# Patient Record
Sex: MALE | Race: WHITE
[De-identification: names, ages, dates, MRNs, and addresses within clinical notes are randomized per-mention and may not be internally consistent; named-entity substitution may affect disease eponyms.]

---

## 2020-05-28 ENCOUNTER — RESULT REVIEW (OUTPATIENT)
Age: 80
End: 2020-05-28

## 2020-05-28 ENCOUNTER — OUTPATIENT (OUTPATIENT)
Dept: OUTPATIENT SERVICES | Facility: HOSPITAL | Age: 80
LOS: 1 days | End: 2020-05-28
Payer: MEDICARE

## 2020-05-28 DIAGNOSIS — C50.521: ICD-10-CM

## 2020-05-28 DIAGNOSIS — Z17.0 ESTROGEN RECEPTOR POSITIVE STATUS [ER+]: ICD-10-CM

## 2020-05-28 PROCEDURE — 88321 CONSLTJ&REPRT SLD PREP ELSWR: CPT

## 2020-05-29 ENCOUNTER — OUTPATIENT (OUTPATIENT)
Dept: OUTPATIENT SERVICES | Facility: HOSPITAL | Age: 80
LOS: 1 days | End: 2020-05-29
Payer: MEDICARE

## 2020-05-29 DIAGNOSIS — Z98.890 OTHER SPECIFIED POSTPROCEDURAL STATES: Chronic | ICD-10-CM

## 2020-05-29 DIAGNOSIS — C50.521: ICD-10-CM

## 2020-05-29 DIAGNOSIS — Z17.0 ESTROGEN RECEPTOR POSITIVE STATUS [ER+]: ICD-10-CM

## 2020-05-29 DIAGNOSIS — Z01.818 ENCOUNTER FOR OTHER PREPROCEDURAL EXAMINATION: ICD-10-CM

## 2020-05-29 LAB
A1C WITH ESTIMATED AVERAGE GLUCOSE RESULT: 7.9 % — HIGH (ref 4–5.6)
ANION GAP SERPL CALC-SCNC: 6 MMOL/L — SIGNIFICANT CHANGE UP (ref 5–17)
APPEARANCE UR: CLEAR — SIGNIFICANT CHANGE UP
BASOPHILS # BLD AUTO: 0.03 K/UL — SIGNIFICANT CHANGE UP (ref 0–0.2)
BASOPHILS NFR BLD AUTO: 0.6 % — SIGNIFICANT CHANGE UP (ref 0–2)
BILIRUB UR-MCNC: NEGATIVE — SIGNIFICANT CHANGE UP
BUN SERPL-MCNC: 31 MG/DL — HIGH (ref 7–23)
CALCIUM SERPL-MCNC: 9.8 MG/DL — SIGNIFICANT CHANGE UP (ref 8.5–10.1)
CHLORIDE SERPL-SCNC: 105 MMOL/L — SIGNIFICANT CHANGE UP (ref 96–108)
CO2 SERPL-SCNC: 26 MMOL/L — SIGNIFICANT CHANGE UP (ref 22–31)
COLOR SPEC: YELLOW — SIGNIFICANT CHANGE UP
CREAT SERPL-MCNC: 1.47 MG/DL — HIGH (ref 0.5–1.3)
DIFF PNL FLD: NEGATIVE — SIGNIFICANT CHANGE UP
EOSINOPHIL # BLD AUTO: 0.15 K/UL — SIGNIFICANT CHANGE UP (ref 0–0.5)
EOSINOPHIL NFR BLD AUTO: 2.9 % — SIGNIFICANT CHANGE UP (ref 0–6)
ESTIMATED AVERAGE GLUCOSE: 180 MG/DL — HIGH (ref 68–114)
GLUCOSE SERPL-MCNC: 196 MG/DL — HIGH (ref 70–99)
GLUCOSE UR QL: 1000 MG/DL
HCT VFR BLD CALC: 39.5 % — SIGNIFICANT CHANGE UP (ref 39–50)
HGB BLD-MCNC: 13.2 G/DL — SIGNIFICANT CHANGE UP (ref 13–17)
IMM GRANULOCYTES NFR BLD AUTO: 0.2 % — SIGNIFICANT CHANGE UP (ref 0–1.5)
KETONES UR-MCNC: NEGATIVE — SIGNIFICANT CHANGE UP
LEUKOCYTE ESTERASE UR-ACNC: NEGATIVE — SIGNIFICANT CHANGE UP
LYMPHOCYTES # BLD AUTO: 1.7 K/UL — SIGNIFICANT CHANGE UP (ref 1–3.3)
LYMPHOCYTES # BLD AUTO: 32.4 % — SIGNIFICANT CHANGE UP (ref 13–44)
MCHC RBC-ENTMCNC: 28.6 PG — SIGNIFICANT CHANGE UP (ref 27–34)
MCHC RBC-ENTMCNC: 33.4 GM/DL — SIGNIFICANT CHANGE UP (ref 32–36)
MCV RBC AUTO: 85.5 FL — SIGNIFICANT CHANGE UP (ref 80–100)
MONOCYTES # BLD AUTO: 0.39 K/UL — SIGNIFICANT CHANGE UP (ref 0–0.9)
MONOCYTES NFR BLD AUTO: 7.4 % — SIGNIFICANT CHANGE UP (ref 2–14)
NEUTROPHILS # BLD AUTO: 2.96 K/UL — SIGNIFICANT CHANGE UP (ref 1.8–7.4)
NEUTROPHILS NFR BLD AUTO: 56.5 % — SIGNIFICANT CHANGE UP (ref 43–77)
NITRITE UR-MCNC: NEGATIVE — SIGNIFICANT CHANGE UP
PH UR: 6 — SIGNIFICANT CHANGE UP (ref 5–8)
PLATELET # BLD AUTO: 170 K/UL — SIGNIFICANT CHANGE UP (ref 150–400)
POTASSIUM SERPL-MCNC: 3.9 MMOL/L — SIGNIFICANT CHANGE UP (ref 3.5–5.3)
POTASSIUM SERPL-SCNC: 3.9 MMOL/L — SIGNIFICANT CHANGE UP (ref 3.5–5.3)
PROT UR-MCNC: NEGATIVE MG/DL — SIGNIFICANT CHANGE UP
RBC # BLD: 4.62 M/UL — SIGNIFICANT CHANGE UP (ref 4.2–5.8)
RBC # FLD: 14 % — SIGNIFICANT CHANGE UP (ref 10.3–14.5)
SODIUM SERPL-SCNC: 137 MMOL/L — SIGNIFICANT CHANGE UP (ref 135–145)
SP GR SPEC: 1.01 — SIGNIFICANT CHANGE UP (ref 1.01–1.02)
UROBILINOGEN FLD QL: NEGATIVE MG/DL — SIGNIFICANT CHANGE UP
WBC # BLD: 5.24 K/UL — SIGNIFICANT CHANGE UP (ref 3.8–10.5)
WBC # FLD AUTO: 5.24 K/UL — SIGNIFICANT CHANGE UP (ref 3.8–10.5)

## 2020-05-29 PROCEDURE — 83036 HEMOGLOBIN GLYCOSYLATED A1C: CPT

## 2020-05-29 PROCEDURE — 86850 RBC ANTIBODY SCREEN: CPT

## 2020-05-29 PROCEDURE — 80048 BASIC METABOLIC PNL TOTAL CA: CPT

## 2020-05-29 PROCEDURE — 85025 COMPLETE CBC W/AUTO DIFF WBC: CPT

## 2020-05-29 PROCEDURE — 36415 COLL VENOUS BLD VENIPUNCTURE: CPT

## 2020-05-29 PROCEDURE — 93010 ELECTROCARDIOGRAM REPORT: CPT

## 2020-05-29 PROCEDURE — 86901 BLOOD TYPING SEROLOGIC RH(D): CPT

## 2020-05-29 PROCEDURE — 86900 BLOOD TYPING SEROLOGIC ABO: CPT

## 2020-05-29 PROCEDURE — 93005 ELECTROCARDIOGRAM TRACING: CPT

## 2020-05-29 PROCEDURE — 81003 URINALYSIS AUTO W/O SCOPE: CPT

## 2020-05-29 NOTE — ASU PATIENT PROFILE, ADULT - IS PATIENT PREGNANT?
Called in promethazine syrup to  [facility] pt complained of the taste of pill. Promethazine 6.25/5ml 10-20ml Q6 PRN #240ml   
not applicable (Male)

## 2020-05-29 NOTE — ASU PATIENT PROFILE, ADULT - PMH
Atrial fibrillation  1 episode in 2014  Breast cancer in male  Right breast (dx 2020)  Diabetes    Gout    HLD (hyperlipidemia)

## 2020-05-30 DIAGNOSIS — Z01.818 ENCOUNTER FOR OTHER PREPROCEDURAL EXAMINATION: ICD-10-CM

## 2020-06-02 ENCOUNTER — OUTPATIENT (OUTPATIENT)
Dept: OUTPATIENT SERVICES | Facility: HOSPITAL | Age: 80
LOS: 1 days | End: 2020-06-02
Payer: MEDICARE

## 2020-06-02 DIAGNOSIS — Z98.890 OTHER SPECIFIED POSTPROCEDURAL STATES: Chronic | ICD-10-CM

## 2020-06-02 DIAGNOSIS — Z11.59 ENCOUNTER FOR SCREENING FOR OTHER VIRAL DISEASES: ICD-10-CM

## 2020-06-02 PROCEDURE — U0003: CPT

## 2020-06-03 DIAGNOSIS — Z11.59 ENCOUNTER FOR SCREENING FOR OTHER VIRAL DISEASES: ICD-10-CM

## 2020-06-03 LAB — SARS-COV-2 RNA SPEC QL NAA+PROBE: SIGNIFICANT CHANGE UP

## 2020-06-03 RX ORDER — HYDROMORPHONE HYDROCHLORIDE 2 MG/ML
0.5 INJECTION INTRAMUSCULAR; INTRAVENOUS; SUBCUTANEOUS
Refills: 0 | Status: DISCONTINUED | OUTPATIENT
Start: 2020-06-04 | End: 2020-06-04

## 2020-06-03 RX ORDER — SODIUM CHLORIDE 9 MG/ML
1000 INJECTION, SOLUTION INTRAVENOUS
Refills: 0 | Status: DISCONTINUED | OUTPATIENT
Start: 2020-06-04 | End: 2020-06-04

## 2020-06-03 RX ORDER — MEPERIDINE HYDROCHLORIDE 50 MG/ML
12.5 INJECTION INTRAMUSCULAR; INTRAVENOUS; SUBCUTANEOUS
Refills: 0 | Status: DISCONTINUED | OUTPATIENT
Start: 2020-06-04 | End: 2020-06-04

## 2020-06-03 RX ORDER — OXYCODONE HYDROCHLORIDE 5 MG/1
5 TABLET ORAL ONCE
Refills: 0 | Status: DISCONTINUED | OUTPATIENT
Start: 2020-06-04 | End: 2020-06-04

## 2020-06-03 RX ORDER — ONDANSETRON 8 MG/1
4 TABLET, FILM COATED ORAL ONCE
Refills: 0 | Status: DISCONTINUED | OUTPATIENT
Start: 2020-06-04 | End: 2020-06-04

## 2020-06-03 RX ORDER — FENTANYL CITRATE 50 UG/ML
50 INJECTION INTRAVENOUS
Refills: 0 | Status: DISCONTINUED | OUTPATIENT
Start: 2020-06-04 | End: 2020-06-04

## 2020-06-04 ENCOUNTER — RESULT REVIEW (OUTPATIENT)
Age: 80
End: 2020-06-04

## 2020-06-04 ENCOUNTER — OUTPATIENT (OUTPATIENT)
Dept: INPATIENT UNIT | Facility: HOSPITAL | Age: 80
LOS: 1 days | Discharge: HOME | End: 2020-06-04
Payer: MEDICARE

## 2020-06-04 VITALS
OXYGEN SATURATION: 99 % | RESPIRATION RATE: 14 BRPM | SYSTOLIC BLOOD PRESSURE: 131 MMHG | TEMPERATURE: 97 F | DIASTOLIC BLOOD PRESSURE: 76 MMHG | HEART RATE: 63 BPM

## 2020-06-04 VITALS
OXYGEN SATURATION: 100 % | DIASTOLIC BLOOD PRESSURE: 75 MMHG | SYSTOLIC BLOOD PRESSURE: 135 MMHG | RESPIRATION RATE: 16 BRPM | WEIGHT: 195.11 LBS | HEART RATE: 49 BPM | TEMPERATURE: 98 F | HEIGHT: 73 IN

## 2020-06-04 DIAGNOSIS — Z17.0 ESTROGEN RECEPTOR POSITIVE STATUS [ER+]: ICD-10-CM

## 2020-06-04 DIAGNOSIS — C50.521: ICD-10-CM

## 2020-06-04 DIAGNOSIS — Z98.890 OTHER SPECIFIED POSTPROCEDURAL STATES: Chronic | ICD-10-CM

## 2020-06-04 PROCEDURE — 88307 TISSUE EXAM BY PATHOLOGIST: CPT

## 2020-06-04 PROCEDURE — 78195 LYMPH SYSTEM IMAGING: CPT | Mod: 26

## 2020-06-04 PROCEDURE — A9520: CPT

## 2020-06-04 PROCEDURE — 19307 MAST MOD RAD: CPT | Mod: AS,59,RT

## 2020-06-04 PROCEDURE — 88307 TISSUE EXAM BY PATHOLOGIST: CPT | Mod: 26

## 2020-06-04 PROCEDURE — 19303 MAST SIMPLE COMPLETE: CPT | Mod: AS,59,LT

## 2020-06-04 PROCEDURE — 19366: CPT | Mod: AS,RT

## 2020-06-04 PROCEDURE — 78195 LYMPH SYSTEM IMAGING: CPT

## 2020-06-04 RX ORDER — ALLOPURINOL 300 MG
1 TABLET ORAL
Qty: 0 | Refills: 0 | DISCHARGE

## 2020-06-04 RX ORDER — ATORVASTATIN CALCIUM 80 MG/1
1 TABLET, FILM COATED ORAL
Qty: 0 | Refills: 0 | DISCHARGE

## 2020-06-04 RX ORDER — ACETAMINOPHEN 500 MG
975 TABLET ORAL ONCE
Refills: 0 | Status: COMPLETED | OUTPATIENT
Start: 2020-06-04 | End: 2020-06-04

## 2020-06-04 RX ORDER — FAMOTIDINE 10 MG/ML
20 INJECTION INTRAVENOUS ONCE
Refills: 0 | Status: COMPLETED | OUTPATIENT
Start: 2020-06-04 | End: 2020-06-04

## 2020-06-04 RX ORDER — CHOLECALCIFEROL (VITAMIN D3) 125 MCG
0 CAPSULE ORAL
Qty: 0 | Refills: 0 | DISCHARGE

## 2020-06-04 RX ADMIN — FAMOTIDINE 20 MILLIGRAM(S): 10 INJECTION INTRAVENOUS at 07:27

## 2020-06-04 RX ADMIN — Medication 975 MILLIGRAM(S): at 07:27

## 2020-06-04 NOTE — ASU DISCHARGE PLAN (ADULT/PEDIATRIC) - CALL YOUR DOCTOR IF YOU HAVE ANY OF THE FOLLOWING:
Swelling that gets worse/Excessive diarrhea/Nausea and vomiting that does not stop/Unable to urinate/Increased irritability or sluggishness/Pain not relieved by Medications/Inability to tolerate liquids or foods/Fever greater than (need to indicate Fahrenheit or Celsius)/Numbness, tingling, color or temperature change to extremity/Bleeding that does not stop

## 2020-06-04 NOTE — ASU DISCHARGE PLAN (ADULT/PEDIATRIC) - CARE PROVIDER_API CALL
Bernard Bush  SURGERY  [address] [place] [address]  Phone: [phone]  Fax: [phone]  Follow Up Time:

## 2020-06-11 DIAGNOSIS — I48.91 UNSPECIFIED ATRIAL FIBRILLATION: ICD-10-CM

## 2020-06-11 DIAGNOSIS — C50.921 MALIGNANT NEOPLASM OF UNSPECIFIED SITE OF RIGHT MALE BREAST: ICD-10-CM

## 2020-06-11 DIAGNOSIS — E78.5 HYPERLIPIDEMIA, UNSPECIFIED: ICD-10-CM

## 2020-06-11 DIAGNOSIS — Z79.82 LONG TERM (CURRENT) USE OF ASPIRIN: ICD-10-CM

## 2020-06-11 DIAGNOSIS — E11.9 TYPE 2 DIABETES MELLITUS WITHOUT COMPLICATIONS: ICD-10-CM

## 2020-06-11 DIAGNOSIS — M10.9 GOUT, UNSPECIFIED: ICD-10-CM

## 2020-06-11 DIAGNOSIS — Z15.01 GENETIC SUSCEPTIBILITY TO MALIGNANT NEOPLASM OF BREAST: ICD-10-CM

## 2020-06-12 DIAGNOSIS — C50.521: ICD-10-CM

## 2020-06-12 DIAGNOSIS — Z17.0 ESTROGEN RECEPTOR POSITIVE STATUS [ER+]: ICD-10-CM

## 2020-06-17 DIAGNOSIS — C50.521: ICD-10-CM

## 2020-06-17 DIAGNOSIS — Z17.0 ESTROGEN RECEPTOR POSITIVE STATUS [ER+]: ICD-10-CM

## 2022-01-01 ENCOUNTER — INPATIENT (INPATIENT)
Facility: HOSPITAL | Age: 82
LOS: 1 days | DRG: 208 | End: 2022-12-28
Attending: SURGERY | Admitting: SURGERY
Payer: MEDICARE

## 2022-01-01 ENCOUNTER — EMERGENCY (EMERGENCY)
Facility: HOSPITAL | Age: 82
LOS: 0 days | Discharge: HOME | End: 2022-03-12
Attending: EMERGENCY MEDICINE
Payer: MEDICARE

## 2022-01-01 VITALS
OXYGEN SATURATION: 98 % | TEMPERATURE: 98 F | SYSTOLIC BLOOD PRESSURE: 120 MMHG | DIASTOLIC BLOOD PRESSURE: 66 MMHG | HEART RATE: 66 BPM | RESPIRATION RATE: 20 BRPM

## 2022-01-01 VITALS — HEART RATE: 80 BPM | TEMPERATURE: 98 F | DIASTOLIC BLOOD PRESSURE: 79 MMHG | SYSTOLIC BLOOD PRESSURE: 91 MMHG

## 2022-01-01 VITALS — WEIGHT: 175.05 LBS | HEIGHT: 73 IN

## 2022-01-01 DIAGNOSIS — Z98.890 OTHER SPECIFIED POSTPROCEDURAL STATES: Chronic | ICD-10-CM

## 2022-01-01 DIAGNOSIS — E78.5 HYPERLIPIDEMIA, UNSPECIFIED: ICD-10-CM

## 2022-01-01 DIAGNOSIS — N18.9 CHRONIC KIDNEY DISEASE, UNSPECIFIED: ICD-10-CM

## 2022-01-01 DIAGNOSIS — R57.8 OTHER SHOCK: ICD-10-CM

## 2022-01-01 DIAGNOSIS — N17.9 ACUTE KIDNEY FAILURE, UNSPECIFIED: ICD-10-CM

## 2022-01-01 DIAGNOSIS — Z79.82 LONG TERM (CURRENT) USE OF ASPIRIN: ICD-10-CM

## 2022-01-01 DIAGNOSIS — I82.401 ACUTE EMBOLISM AND THROMBOSIS OF UNSPECIFIED DEEP VEINS OF RIGHT LOWER EXTREMITY: ICD-10-CM

## 2022-01-01 DIAGNOSIS — R07.89 OTHER CHEST PAIN: ICD-10-CM

## 2022-01-01 DIAGNOSIS — M10.9 GOUT, UNSPECIFIED: ICD-10-CM

## 2022-01-01 DIAGNOSIS — R10.9 UNSPECIFIED ABDOMINAL PAIN: ICD-10-CM

## 2022-01-01 DIAGNOSIS — Z95.1 PRESENCE OF AORTOCORONARY BYPASS GRAFT: ICD-10-CM

## 2022-01-01 DIAGNOSIS — J96.01 ACUTE RESPIRATORY FAILURE WITH HYPOXIA: ICD-10-CM

## 2022-01-01 DIAGNOSIS — E87.20 ACIDOSIS, UNSPECIFIED: ICD-10-CM

## 2022-01-01 DIAGNOSIS — I26.09 OTHER PULMONARY EMBOLISM WITH ACUTE COR PULMONALE: ICD-10-CM

## 2022-01-01 DIAGNOSIS — Z90.13 ACQUIRED ABSENCE OF BILATERAL BREASTS AND NIPPLES: ICD-10-CM

## 2022-01-01 DIAGNOSIS — I45.10 UNSPECIFIED RIGHT BUNDLE-BRANCH BLOCK: ICD-10-CM

## 2022-01-01 DIAGNOSIS — I31.39 OTHER PERICARDIAL EFFUSION (NONINFLAMMATORY): ICD-10-CM

## 2022-01-01 DIAGNOSIS — K72.00 ACUTE AND SUBACUTE HEPATIC FAILURE WITHOUT COMA: ICD-10-CM

## 2022-01-01 DIAGNOSIS — G93.1 ANOXIC BRAIN DAMAGE, NOT ELSEWHERE CLASSIFIED: ICD-10-CM

## 2022-01-01 DIAGNOSIS — I48.91 UNSPECIFIED ATRIAL FIBRILLATION: ICD-10-CM

## 2022-01-01 DIAGNOSIS — Z85.3 PERSONAL HISTORY OF MALIGNANT NEOPLASM OF BREAST: ICD-10-CM

## 2022-01-01 DIAGNOSIS — I25.10 ATHEROSCLEROTIC HEART DISEASE OF NATIVE CORONARY ARTERY WITHOUT ANGINA PECTORIS: ICD-10-CM

## 2022-01-01 DIAGNOSIS — Z66 DO NOT RESUSCITATE: ICD-10-CM

## 2022-01-01 DIAGNOSIS — Z79.899 OTHER LONG TERM (CURRENT) DRUG THERAPY: ICD-10-CM

## 2022-01-01 DIAGNOSIS — E43 UNSPECIFIED SEVERE PROTEIN-CALORIE MALNUTRITION: ICD-10-CM

## 2022-01-01 DIAGNOSIS — Z95.1 PRESENCE OF AORTOCORONARY BYPASS GRAFT: Chronic | ICD-10-CM

## 2022-01-01 DIAGNOSIS — Z79.84 LONG TERM (CURRENT) USE OF ORAL HYPOGLYCEMIC DRUGS: ICD-10-CM

## 2022-01-01 DIAGNOSIS — I46.9 CARDIAC ARREST, CAUSE UNSPECIFIED: ICD-10-CM

## 2022-01-01 DIAGNOSIS — Z86.74 PERSONAL HISTORY OF SUDDEN CARDIAC ARREST: ICD-10-CM

## 2022-01-01 DIAGNOSIS — Z20.822 CONTACT WITH AND (SUSPECTED) EXPOSURE TO COVID-19: ICD-10-CM

## 2022-01-01 DIAGNOSIS — Z90.13 ACQUIRED ABSENCE OF BILATERAL BREASTS AND NIPPLES: Chronic | ICD-10-CM

## 2022-01-01 DIAGNOSIS — G93.6 CEREBRAL EDEMA: ICD-10-CM

## 2022-01-01 DIAGNOSIS — E11.9 TYPE 2 DIABETES MELLITUS WITHOUT COMPLICATIONS: ICD-10-CM

## 2022-01-01 DIAGNOSIS — E11.22 TYPE 2 DIABETES MELLITUS WITH DIABETIC CHRONIC KIDNEY DISEASE: ICD-10-CM

## 2022-01-01 DIAGNOSIS — Z51.5 ENCOUNTER FOR PALLIATIVE CARE: ICD-10-CM

## 2022-01-01 LAB
A1C WITH ESTIMATED AVERAGE GLUCOSE RESULT: 7.7 % — HIGH (ref 4–5.6)
ALBUMIN SERPL ELPH-MCNC: 1.7 G/DL — LOW (ref 3.3–5)
ALBUMIN SERPL ELPH-MCNC: 1.7 G/DL — LOW (ref 3.3–5)
ALBUMIN SERPL ELPH-MCNC: 1.8 G/DL — LOW (ref 3.3–5)
ALBUMIN SERPL ELPH-MCNC: 2.2 G/DL — LOW (ref 3.3–5)
ALBUMIN SERPL ELPH-MCNC: 3.4 G/DL — SIGNIFICANT CHANGE UP (ref 3.3–5)
ALP SERPL-CCNC: 111 U/L — SIGNIFICANT CHANGE UP (ref 40–120)
ALP SERPL-CCNC: 152 U/L — HIGH (ref 40–120)
ALP SERPL-CCNC: 46 U/L — SIGNIFICANT CHANGE UP (ref 40–120)
ALP SERPL-CCNC: 69 U/L — SIGNIFICANT CHANGE UP (ref 40–120)
ALP SERPL-CCNC: 96 U/L — SIGNIFICANT CHANGE UP (ref 40–120)
ALT FLD-CCNC: 112 U/L — HIGH (ref 12–78)
ALT FLD-CCNC: 31 U/L — SIGNIFICANT CHANGE UP (ref 12–78)
ALT FLD-CCNC: 424 U/L — HIGH (ref 12–78)
ALT FLD-CCNC: 428 U/L — HIGH (ref 12–78)
ALT FLD-CCNC: 770 U/L — HIGH (ref 12–78)
ANION GAP SERPL CALC-SCNC: 10 MMOL/L — SIGNIFICANT CHANGE UP (ref 5–17)
ANION GAP SERPL CALC-SCNC: 11 MMOL/L — SIGNIFICANT CHANGE UP (ref 5–17)
ANION GAP SERPL CALC-SCNC: 16 MMOL/L — SIGNIFICANT CHANGE UP (ref 5–17)
ANION GAP SERPL CALC-SCNC: 16 MMOL/L — SIGNIFICANT CHANGE UP (ref 5–17)
ANION GAP SERPL CALC-SCNC: 23 MMOL/L — HIGH (ref 5–17)
ANION GAP SERPL CALC-SCNC: 7 MMOL/L — SIGNIFICANT CHANGE UP (ref 5–17)
APPEARANCE UR: ABNORMAL
APTT BLD: 119.5 SEC — HIGH (ref 27.5–35.5)
APTT BLD: 136.7 SEC — CRITICAL HIGH (ref 27.5–35.5)
APTT BLD: 153.7 SEC — CRITICAL HIGH (ref 27.5–35.5)
APTT BLD: 26 SEC — LOW (ref 27.5–35.5)
APTT BLD: 75.5 SEC — HIGH (ref 27.5–35.5)
APTT BLD: 84.2 SEC — HIGH (ref 27.5–35.5)
APTT BLD: 90.5 SEC — HIGH (ref 27.5–35.5)
AST SERPL-CCNC: 129 U/L — HIGH (ref 15–37)
AST SERPL-CCNC: 22 U/L — SIGNIFICANT CHANGE UP (ref 15–37)
AST SERPL-CCNC: 605 U/L — HIGH (ref 15–37)
AST SERPL-CCNC: 615 U/L — HIGH (ref 15–37)
AST SERPL-CCNC: 658 U/L — HIGH (ref 15–37)
BACTERIA # UR AUTO: ABNORMAL
BASE EXCESS BLDA CALC-SCNC: -14.5 MMOL/L — LOW (ref -2–3)
BASE EXCESS BLDA CALC-SCNC: -15.6 MMOL/L — LOW (ref -2–3)
BASE EXCESS BLDA CALC-SCNC: -7.9 MMOL/L — LOW (ref -2–3)
BASOPHILS # BLD AUTO: 0 K/UL — SIGNIFICANT CHANGE UP (ref 0–0.2)
BASOPHILS # BLD AUTO: 0 K/UL — SIGNIFICANT CHANGE UP (ref 0–0.2)
BASOPHILS # BLD AUTO: 0.03 K/UL — SIGNIFICANT CHANGE UP (ref 0–0.2)
BASOPHILS NFR BLD AUTO: 0 % — SIGNIFICANT CHANGE UP (ref 0–2)
BASOPHILS NFR BLD AUTO: 0 % — SIGNIFICANT CHANGE UP (ref 0–2)
BASOPHILS NFR BLD AUTO: 0.6 % — SIGNIFICANT CHANGE UP (ref 0–2)
BILIRUB DIRECT SERPL-MCNC: 0.1 MG/DL — SIGNIFICANT CHANGE UP (ref 0–0.3)
BILIRUB INDIRECT FLD-MCNC: 0.3 MG/DL — SIGNIFICANT CHANGE UP (ref 0.2–1)
BILIRUB SERPL-MCNC: 0.2 MG/DL — SIGNIFICANT CHANGE UP (ref 0.2–1.2)
BILIRUB SERPL-MCNC: 0.2 MG/DL — SIGNIFICANT CHANGE UP (ref 0.2–1.2)
BILIRUB SERPL-MCNC: 0.4 MG/DL — SIGNIFICANT CHANGE UP (ref 0.2–1.2)
BILIRUB SERPL-MCNC: 0.4 MG/DL — SIGNIFICANT CHANGE UP (ref 0.2–1.2)
BILIRUB SERPL-MCNC: 0.9 MG/DL — SIGNIFICANT CHANGE UP (ref 0.2–1.2)
BILIRUB UR-MCNC: NEGATIVE — SIGNIFICANT CHANGE UP
BIZARRE PLATELETS BLD QL SMEAR: PRESENT — SIGNIFICANT CHANGE UP
BLOOD GAS COMMENTS ARTERIAL: SIGNIFICANT CHANGE UP
BUN SERPL-MCNC: 22 MG/DL — SIGNIFICANT CHANGE UP (ref 7–23)
BUN SERPL-MCNC: 27 MG/DL — HIGH (ref 7–23)
BUN SERPL-MCNC: 27 MG/DL — HIGH (ref 7–23)
BUN SERPL-MCNC: 38 MG/DL — HIGH (ref 7–23)
BUN SERPL-MCNC: 45 MG/DL — HIGH (ref 7–23)
BUN SERPL-MCNC: 46 MG/DL — HIGH (ref 7–23)
BURR CELLS BLD QL SMEAR: PRESENT — SIGNIFICANT CHANGE UP
CALCIUM SERPL-MCNC: 7 MG/DL — LOW (ref 8.5–10.1)
CALCIUM SERPL-MCNC: 7 MG/DL — LOW (ref 8.5–10.1)
CALCIUM SERPL-MCNC: 7.3 MG/DL — LOW (ref 8.5–10.1)
CALCIUM SERPL-MCNC: 7.5 MG/DL — LOW (ref 8.5–10.1)
CALCIUM SERPL-MCNC: 8.8 MG/DL — SIGNIFICANT CHANGE UP (ref 8.5–10.1)
CALCIUM SERPL-MCNC: 9.3 MG/DL — SIGNIFICANT CHANGE UP (ref 8.5–10.1)
CHLORIDE SERPL-SCNC: 103 MMOL/L — SIGNIFICANT CHANGE UP (ref 96–108)
CHLORIDE SERPL-SCNC: 103 MMOL/L — SIGNIFICANT CHANGE UP (ref 96–108)
CHLORIDE SERPL-SCNC: 106 MMOL/L — SIGNIFICANT CHANGE UP (ref 96–108)
CHLORIDE SERPL-SCNC: 106 MMOL/L — SIGNIFICANT CHANGE UP (ref 96–108)
CHLORIDE SERPL-SCNC: 107 MMOL/L — SIGNIFICANT CHANGE UP (ref 96–108)
CHLORIDE SERPL-SCNC: 113 MMOL/L — HIGH (ref 96–108)
CHOLEST SERPL-MCNC: 95 MG/DL — SIGNIFICANT CHANGE UP
CO2 BLDA-SCNC: 14 MMOL/L — LOW (ref 19–24)
CO2 BLDA-SCNC: 15 MMOL/L — LOW (ref 19–24)
CO2 BLDA-SCNC: 19 MMOL/L — SIGNIFICANT CHANGE UP (ref 19–24)
CO2 SERPL-SCNC: 12 MMOL/L — LOW (ref 22–31)
CO2 SERPL-SCNC: 16 MMOL/L — LOW (ref 22–31)
CO2 SERPL-SCNC: 19 MMOL/L — LOW (ref 22–31)
CO2 SERPL-SCNC: 27 MMOL/L — SIGNIFICANT CHANGE UP (ref 22–31)
CO2 SERPL-SCNC: 28 MMOL/L — SIGNIFICANT CHANGE UP (ref 22–31)
CO2 SERPL-SCNC: 28 MMOL/L — SIGNIFICANT CHANGE UP (ref 22–31)
COLOR SPEC: YELLOW — SIGNIFICANT CHANGE UP
CREAT SERPL-MCNC: 1.37 MG/DL — HIGH (ref 0.5–1.3)
CREAT SERPL-MCNC: 1.64 MG/DL — HIGH (ref 0.5–1.3)
CREAT SERPL-MCNC: 1.85 MG/DL — HIGH (ref 0.5–1.3)
CREAT SERPL-MCNC: 2.32 MG/DL — HIGH (ref 0.5–1.3)
CREAT SERPL-MCNC: 2.9 MG/DL — HIGH (ref 0.5–1.3)
CREAT SERPL-MCNC: 3.23 MG/DL — HIGH (ref 0.5–1.3)
DIFF PNL FLD: ABNORMAL
EGFR: 19 ML/MIN/1.73M2 — LOW
EGFR: 21 ML/MIN/1.73M2 — LOW
EGFR: 28 ML/MIN/1.73M2 — LOW
EGFR: 36 ML/MIN/1.73M2 — LOW
EGFR: 42 ML/MIN/1.73M2 — LOW
EGFR: 52 ML/MIN/1.73M2 — LOW
EOSINOPHIL # BLD AUTO: 0 K/UL — SIGNIFICANT CHANGE UP (ref 0–0.5)
EOSINOPHIL # BLD AUTO: 0.11 K/UL — SIGNIFICANT CHANGE UP (ref 0–0.5)
EOSINOPHIL # BLD AUTO: 0.11 K/UL — SIGNIFICANT CHANGE UP (ref 0–0.5)
EOSINOPHIL NFR BLD AUTO: 0 % — SIGNIFICANT CHANGE UP (ref 0–6)
EOSINOPHIL NFR BLD AUTO: 1 % — SIGNIFICANT CHANGE UP (ref 0–6)
EOSINOPHIL NFR BLD AUTO: 2.2 % — SIGNIFICANT CHANGE UP (ref 0–6)
EPI CELLS # UR: SIGNIFICANT CHANGE UP
ESTIMATED AVERAGE GLUCOSE: 174 MG/DL — HIGH (ref 68–114)
GAS PNL BLDA: SIGNIFICANT CHANGE UP
GLUCOSE BLDC GLUCOMTR-MCNC: 308 MG/DL — HIGH (ref 70–99)
GLUCOSE BLDC GLUCOMTR-MCNC: 318 MG/DL — HIGH (ref 70–99)
GLUCOSE BLDC GLUCOMTR-MCNC: 341 MG/DL — HIGH (ref 70–99)
GLUCOSE BLDC GLUCOMTR-MCNC: 364 MG/DL — HIGH (ref 70–99)
GLUCOSE BLDC GLUCOMTR-MCNC: 385 MG/DL — HIGH (ref 70–99)
GLUCOSE BLDC GLUCOMTR-MCNC: 419 MG/DL — HIGH (ref 70–99)
GLUCOSE SERPL-MCNC: 197 MG/DL — HIGH (ref 70–99)
GLUCOSE SERPL-MCNC: 380 MG/DL — HIGH (ref 70–99)
GLUCOSE SERPL-MCNC: 418 MG/DL — HIGH (ref 70–99)
GLUCOSE SERPL-MCNC: 426 MG/DL — HIGH (ref 70–99)
GLUCOSE SERPL-MCNC: 448 MG/DL — HIGH (ref 70–99)
GLUCOSE SERPL-MCNC: 454 MG/DL — CRITICAL HIGH (ref 70–99)
GLUCOSE UR QL: 1000 MG/DL
HCO3 BLDA-SCNC: 13 MMOL/L — LOW (ref 21–28)
HCO3 BLDA-SCNC: 14 MMOL/L — LOW (ref 21–28)
HCO3 BLDA-SCNC: 18 MMOL/L — LOW (ref 21–28)
HCT VFR BLD CALC: 24 % — LOW (ref 39–50)
HCT VFR BLD CALC: 25.5 % — LOW (ref 39–50)
HCT VFR BLD CALC: 30.7 % — LOW (ref 39–50)
HCT VFR BLD CALC: 31 % — LOW (ref 39–50)
HCT VFR BLD CALC: 32.7 % — LOW (ref 39–50)
HCT VFR BLD CALC: 38 % — LOW (ref 39–50)
HCT VFR BLD CALC: 40 % — SIGNIFICANT CHANGE UP (ref 39–50)
HDLC SERPL-MCNC: 33 MG/DL — LOW
HGB BLD-MCNC: 10.3 G/DL — LOW (ref 13–17)
HGB BLD-MCNC: 11.3 G/DL — LOW (ref 13–17)
HGB BLD-MCNC: 12.5 G/DL — LOW (ref 13–17)
HGB BLD-MCNC: 7.7 G/DL — LOW (ref 13–17)
HGB BLD-MCNC: 8.4 G/DL — LOW (ref 13–17)
HGB BLD-MCNC: 9.3 G/DL — LOW (ref 13–17)
HGB BLD-MCNC: 9.8 G/DL — LOW (ref 13–17)
IMM GRANULOCYTES NFR BLD AUTO: 0.2 % — SIGNIFICANT CHANGE UP (ref 0–1.5)
INR BLD: 1.09 RATIO — SIGNIFICANT CHANGE UP (ref 0.88–1.16)
INR BLD: 1.73 RATIO — HIGH (ref 0.88–1.16)
KETONES UR-MCNC: NEGATIVE — SIGNIFICANT CHANGE UP
LACTATE SERPL-SCNC: 10.5 MMOL/L — CRITICAL HIGH (ref 0.7–2)
LACTATE SERPL-SCNC: 14.3 MMOL/L — CRITICAL HIGH (ref 0.7–2)
LACTATE SERPL-SCNC: 17.4 MMOL/L — SIGNIFICANT CHANGE UP (ref 0.7–2)
LACTATE SERPL-SCNC: 9.5 MMOL/L — CRITICAL HIGH (ref 0.7–2)
LEUKOCYTE ESTERASE UR-ACNC: NEGATIVE — SIGNIFICANT CHANGE UP
LIDOCAIN IGE QN: 134 U/L — SIGNIFICANT CHANGE UP (ref 73–393)
LIDOCAIN IGE QN: 79 U/L — SIGNIFICANT CHANGE UP (ref 73–393)
LIPID PNL WITH DIRECT LDL SERPL: 46 MG/DL — SIGNIFICANT CHANGE UP
LYMPHOCYTES # BLD AUTO: 0.68 K/UL — LOW (ref 1–3.3)
LYMPHOCYTES # BLD AUTO: 1.3 K/UL — SIGNIFICANT CHANGE UP (ref 1–3.3)
LYMPHOCYTES # BLD AUTO: 2 % — LOW (ref 13–44)
LYMPHOCYTES # BLD AUTO: 25.8 % — SIGNIFICANT CHANGE UP (ref 13–44)
LYMPHOCYTES # BLD AUTO: 28 % — SIGNIFICANT CHANGE UP (ref 13–44)
LYMPHOCYTES # BLD AUTO: 3.07 K/UL — SIGNIFICANT CHANGE UP (ref 1–3.3)
MAGNESIUM SERPL-MCNC: 1.7 MG/DL — SIGNIFICANT CHANGE UP (ref 1.6–2.6)
MAGNESIUM SERPL-MCNC: 1.8 MG/DL — SIGNIFICANT CHANGE UP (ref 1.6–2.6)
MAGNESIUM SERPL-MCNC: 2.4 MG/DL — SIGNIFICANT CHANGE UP (ref 1.6–2.6)
MANUAL SMEAR VERIFICATION: SIGNIFICANT CHANGE UP
MCHC RBC-ENTMCNC: 27 PG — SIGNIFICANT CHANGE UP (ref 27–34)
MCHC RBC-ENTMCNC: 27.4 PG — SIGNIFICANT CHANGE UP (ref 27–34)
MCHC RBC-ENTMCNC: 27.4 PG — SIGNIFICANT CHANGE UP (ref 27–34)
MCHC RBC-ENTMCNC: 27.8 PG — SIGNIFICANT CHANGE UP (ref 27–34)
MCHC RBC-ENTMCNC: 27.9 PG — SIGNIFICANT CHANGE UP (ref 27–34)
MCHC RBC-ENTMCNC: 27.9 PG — SIGNIFICANT CHANGE UP (ref 27–34)
MCHC RBC-ENTMCNC: 28.3 GM/DL — LOW (ref 32–36)
MCHC RBC-ENTMCNC: 28.7 PG — SIGNIFICANT CHANGE UP (ref 27–34)
MCHC RBC-ENTMCNC: 30.3 GM/DL — LOW (ref 32–36)
MCHC RBC-ENTMCNC: 31.5 GM/DL — LOW (ref 32–36)
MCHC RBC-ENTMCNC: 31.6 GM/DL — LOW (ref 32–36)
MCHC RBC-ENTMCNC: 32.1 GM/DL — SIGNIFICANT CHANGE UP (ref 32–36)
MCHC RBC-ENTMCNC: 32.9 GM/DL — SIGNIFICANT CHANGE UP (ref 32–36)
MCHC RBC-ENTMCNC: 32.9 GM/DL — SIGNIFICANT CHANGE UP (ref 32–36)
MCV RBC AUTO: 84.2 FL — SIGNIFICANT CHANGE UP (ref 80–100)
MCV RBC AUTO: 84.7 FL — SIGNIFICANT CHANGE UP (ref 80–100)
MCV RBC AUTO: 87.2 FL — SIGNIFICANT CHANGE UP (ref 80–100)
MCV RBC AUTO: 88.1 FL — SIGNIFICANT CHANGE UP (ref 80–100)
MCV RBC AUTO: 88.6 FL — SIGNIFICANT CHANGE UP (ref 80–100)
MCV RBC AUTO: 90.6 FL — SIGNIFICANT CHANGE UP (ref 80–100)
MCV RBC AUTO: 97.1 FL — SIGNIFICANT CHANGE UP (ref 80–100)
MONOCYTES # BLD AUTO: 0.22 K/UL — SIGNIFICANT CHANGE UP (ref 0–0.9)
MONOCYTES # BLD AUTO: 0.57 K/UL — SIGNIFICANT CHANGE UP (ref 0–0.9)
MONOCYTES # BLD AUTO: 0.68 K/UL — SIGNIFICANT CHANGE UP (ref 0–0.9)
MONOCYTES NFR BLD AUTO: 11.3 % — SIGNIFICANT CHANGE UP (ref 2–14)
MONOCYTES NFR BLD AUTO: 2 % — SIGNIFICANT CHANGE UP (ref 2–14)
MONOCYTES NFR BLD AUTO: 2 % — SIGNIFICANT CHANGE UP (ref 2–14)
NEUTROPHILS # BLD AUTO: 3.02 K/UL — SIGNIFICANT CHANGE UP (ref 1.8–7.4)
NEUTROPHILS # BLD AUTO: 32.52 K/UL — HIGH (ref 1.8–7.4)
NEUTROPHILS # BLD AUTO: 7.46 K/UL — HIGH (ref 1.8–7.4)
NEUTROPHILS NFR BLD AUTO: 59.9 % — SIGNIFICANT CHANGE UP (ref 43–77)
NEUTROPHILS NFR BLD AUTO: 66 % — SIGNIFICANT CHANGE UP (ref 43–77)
NEUTROPHILS NFR BLD AUTO: 90 % — HIGH (ref 43–77)
NEUTS BAND # BLD: 2 % — SIGNIFICANT CHANGE UP (ref 0–8)
NITRITE UR-MCNC: NEGATIVE — SIGNIFICANT CHANGE UP
NON HDL CHOLESTEROL: 62 MG/DL — SIGNIFICANT CHANGE UP
NRBC # BLD: 0 /100 — SIGNIFICANT CHANGE UP (ref 0–0)
NRBC # BLD: SIGNIFICANT CHANGE UP /100 WBCS (ref 0–0)
NRBC # BLD: SIGNIFICANT CHANGE UP /100 WBCS (ref 0–0)
NT-PROBNP SERPL-SCNC: 569 PG/ML — HIGH (ref 0–450)
NT-PROBNP SERPL-SCNC: 803 PG/ML — HIGH (ref 0–450)
PCO2 BLDA: 33 MMHG — LOW (ref 35–48)
PCO2 BLDA: 36 MMHG — SIGNIFICANT CHANGE UP (ref 35–48)
PCO2 BLDA: 44 MMHG — SIGNIFICANT CHANGE UP (ref 35–48)
PCO2 BLDV: 79 MMHG — HIGH (ref 42–55)
PH BLDA: 7.1 — CRITICAL LOW (ref 7.35–7.45)
PH BLDA: 7.19 — CRITICAL LOW (ref 7.35–7.45)
PH BLDA: 7.3 — LOW (ref 7.35–7.45)
PH BLDV: <6.8 — CRITICAL LOW (ref 7.32–7.43)
PH UR: 6.5 — SIGNIFICANT CHANGE UP (ref 5–8)
PHOSPHATE SERPL-MCNC: 5.4 MG/DL — HIGH (ref 2.5–4.5)
PHOSPHATE SERPL-MCNC: 5.9 MG/DL — HIGH (ref 2.5–4.5)
PHOSPHATE SERPL-MCNC: 6.7 MG/DL — HIGH (ref 2.5–4.5)
PLAT MORPH BLD: NORMAL — SIGNIFICANT CHANGE UP
PLATELET # BLD AUTO: 138 K/UL — LOW (ref 150–400)
PLATELET # BLD AUTO: 143 K/UL — LOW (ref 150–400)
PLATELET # BLD AUTO: 145 K/UL — LOW (ref 150–400)
PLATELET # BLD AUTO: 153 K/UL — SIGNIFICANT CHANGE UP (ref 150–400)
PLATELET # BLD AUTO: 202 K/UL — SIGNIFICANT CHANGE UP (ref 150–400)
PLATELET # BLD AUTO: 213 K/UL — SIGNIFICANT CHANGE UP (ref 150–400)
PLATELET # BLD AUTO: 228 K/UL — SIGNIFICANT CHANGE UP (ref 150–400)
PO2 BLDA: 112 MMHG — HIGH (ref 83–108)
PO2 BLDA: 138 MMHG — HIGH (ref 83–108)
PO2 BLDA: 231 MMHG — HIGH (ref 83–108)
PO2 BLDV: 44 MMHG — SIGNIFICANT CHANGE UP (ref 25–45)
POLYCHROMASIA BLD QL SMEAR: SLIGHT — SIGNIFICANT CHANGE UP
POTASSIUM SERPL-MCNC: 3.6 MMOL/L — SIGNIFICANT CHANGE UP (ref 3.5–5.3)
POTASSIUM SERPL-MCNC: 3.9 MMOL/L — SIGNIFICANT CHANGE UP (ref 3.5–5.3)
POTASSIUM SERPL-MCNC: 3.9 MMOL/L — SIGNIFICANT CHANGE UP (ref 3.5–5.3)
POTASSIUM SERPL-MCNC: 4 MMOL/L — SIGNIFICANT CHANGE UP (ref 3.5–5.3)
POTASSIUM SERPL-MCNC: 4.3 MMOL/L — SIGNIFICANT CHANGE UP (ref 3.5–5.3)
POTASSIUM SERPL-MCNC: 4.4 MMOL/L — SIGNIFICANT CHANGE UP (ref 3.5–5.3)
POTASSIUM SERPL-SCNC: 3.6 MMOL/L — SIGNIFICANT CHANGE UP (ref 3.5–5.3)
POTASSIUM SERPL-SCNC: 3.9 MMOL/L — SIGNIFICANT CHANGE UP (ref 3.5–5.3)
POTASSIUM SERPL-SCNC: 3.9 MMOL/L — SIGNIFICANT CHANGE UP (ref 3.5–5.3)
POTASSIUM SERPL-SCNC: 4 MMOL/L — SIGNIFICANT CHANGE UP (ref 3.5–5.3)
POTASSIUM SERPL-SCNC: 4.3 MMOL/L — SIGNIFICANT CHANGE UP (ref 3.5–5.3)
POTASSIUM SERPL-SCNC: 4.4 MMOL/L — SIGNIFICANT CHANGE UP (ref 3.5–5.3)
PROT SERPL-MCNC: 4.5 GM/DL — LOW (ref 6–8.3)
PROT SERPL-MCNC: 4.6 GM/DL — LOW (ref 6–8.3)
PROT SERPL-MCNC: 4.6 GM/DL — LOW (ref 6–8.3)
PROT SERPL-MCNC: 5.9 GM/DL — LOW (ref 6–8.3)
PROT SERPL-MCNC: 6.7 GM/DL — SIGNIFICANT CHANGE UP (ref 6–8.3)
PROT UR-MCNC: 100
PROTHROM AB SERPL-ACNC: 12.7 SEC — SIGNIFICANT CHANGE UP (ref 10.5–13.4)
PROTHROM AB SERPL-ACNC: 20.2 SEC — HIGH (ref 10.5–13.4)
RAPID RVP RESULT: SIGNIFICANT CHANGE UP
RBC # BLD: 2.85 M/UL — LOW (ref 4.2–5.8)
RBC # BLD: 3.01 M/UL — LOW (ref 4.2–5.8)
RBC # BLD: 3.39 M/UL — LOW (ref 4.2–5.8)
RBC # BLD: 3.52 M/UL — LOW (ref 4.2–5.8)
RBC # BLD: 3.69 M/UL — LOW (ref 4.2–5.8)
RBC # BLD: 4.12 M/UL — LOW (ref 4.2–5.8)
RBC # BLD: 4.36 M/UL — SIGNIFICANT CHANGE UP (ref 4.2–5.8)
RBC # FLD: 13.6 % — SIGNIFICANT CHANGE UP (ref 10.3–14.5)
RBC # FLD: 13.8 % — SIGNIFICANT CHANGE UP (ref 10.3–14.5)
RBC # FLD: 13.9 % — SIGNIFICANT CHANGE UP (ref 10.3–14.5)
RBC # FLD: 14 % — SIGNIFICANT CHANGE UP (ref 10.3–14.5)
RBC BLD AUTO: SIGNIFICANT CHANGE UP
RBC CASTS # UR COMP ASSIST: ABNORMAL /HPF (ref 0–4)
SAO2 % BLDA: 100 % — SIGNIFICANT CHANGE UP
SAO2 % BLDA: 100 % — SIGNIFICANT CHANGE UP (ref 94–98)
SAO2 % BLDA: 99 % — SIGNIFICANT CHANGE UP
SAO2 % BLDV: 42 % — SIGNIFICANT CHANGE UP (ref 67–88)
SARS-COV-2 RNA SPEC QL NAA+PROBE: SIGNIFICANT CHANGE UP
SODIUM SERPL-SCNC: 141 MMOL/L — SIGNIFICANT CHANGE UP (ref 135–145)
SODIUM SERPL-SCNC: 142 MMOL/L — SIGNIFICANT CHANGE UP (ref 135–145)
SODIUM SERPL-SCNC: 145 MMOL/L — SIGNIFICANT CHANGE UP (ref 135–145)
SP GR SPEC: 1.01 — SIGNIFICANT CHANGE UP (ref 1.01–1.02)
TRIGL SERPL-MCNC: 81 MG/DL — SIGNIFICANT CHANGE UP
TROPONIN I, HIGH SENSITIVITY RESULT: 3335.62 NG/L — HIGH
TROPONIN I, HIGH SENSITIVITY RESULT: 3873.23 NG/L — HIGH
TROPONIN I, HIGH SENSITIVITY RESULT: 61.16 NG/L — SIGNIFICANT CHANGE UP
TROPONIN I, HIGH SENSITIVITY RESULT: 7.12 NG/L — SIGNIFICANT CHANGE UP
UROBILINOGEN FLD QL: NEGATIVE — SIGNIFICANT CHANGE UP
VARIANT LYMPHS # BLD: 1 % — SIGNIFICANT CHANGE UP (ref 0–6)
WBC # BLD: 10.97 K/UL — HIGH (ref 3.8–10.5)
WBC # BLD: 17.36 K/UL — HIGH (ref 3.8–10.5)
WBC # BLD: 18.74 K/UL — HIGH (ref 3.8–10.5)
WBC # BLD: 34.23 K/UL — HIGH (ref 3.8–10.5)
WBC # BLD: 34.3 K/UL — HIGH (ref 3.8–10.5)
WBC # BLD: 39.67 K/UL — HIGH (ref 3.8–10.5)
WBC # BLD: 5.04 K/UL — SIGNIFICANT CHANGE UP (ref 3.8–10.5)
WBC # FLD AUTO: 10.97 K/UL — HIGH (ref 3.8–10.5)
WBC # FLD AUTO: 17.36 K/UL — HIGH (ref 3.8–10.5)
WBC # FLD AUTO: 18.74 K/UL — HIGH (ref 3.8–10.5)
WBC # FLD AUTO: 34.23 K/UL — HIGH (ref 3.8–10.5)
WBC # FLD AUTO: 34.3 K/UL — HIGH (ref 3.8–10.5)
WBC # FLD AUTO: 39.67 K/UL — HIGH (ref 3.8–10.5)
WBC # FLD AUTO: 5.04 K/UL — SIGNIFICANT CHANGE UP (ref 3.8–10.5)
WBC UR QL: SIGNIFICANT CHANGE UP /HPF (ref 0–5)

## 2022-01-01 PROCEDURE — 36415 COLL VENOUS BLD VENIPUNCTURE: CPT

## 2022-01-01 PROCEDURE — 71045 X-RAY EXAM CHEST 1 VIEW: CPT | Mod: 26

## 2022-01-01 PROCEDURE — 85730 THROMBOPLASTIN TIME PARTIAL: CPT

## 2022-01-01 PROCEDURE — 99285 EMERGENCY DEPT VISIT HI MDM: CPT | Mod: 25

## 2022-01-01 PROCEDURE — 82803 BLOOD GASES ANY COMBINATION: CPT

## 2022-01-01 PROCEDURE — 74177 CT ABD & PELVIS W/CONTRAST: CPT | Mod: 26,MD

## 2022-01-01 PROCEDURE — 99291 CRITICAL CARE FIRST HOUR: CPT

## 2022-01-01 PROCEDURE — 99291 CRITICAL CARE FIRST HOUR: CPT | Mod: 25

## 2022-01-01 PROCEDURE — 86901 BLOOD TYPING SEROLOGIC RH(D): CPT

## 2022-01-01 PROCEDURE — 85025 COMPLETE CBC W/AUTO DIFF WBC: CPT

## 2022-01-01 PROCEDURE — 83880 ASSAY OF NATRIURETIC PEPTIDE: CPT

## 2022-01-01 PROCEDURE — 99497 ADVNCD CARE PLAN 30 MIN: CPT | Mod: 25

## 2022-01-01 PROCEDURE — 99497 ADVNCD CARE PLAN 30 MIN: CPT

## 2022-01-01 PROCEDURE — 71275 CT ANGIOGRAPHY CHEST: CPT | Mod: 26,ME

## 2022-01-01 PROCEDURE — G1004: CPT

## 2022-01-01 PROCEDURE — 85610 PROTHROMBIN TIME: CPT

## 2022-01-01 PROCEDURE — 71045 X-RAY EXAM CHEST 1 VIEW: CPT

## 2022-01-01 PROCEDURE — 36556 INSERT NON-TUNNEL CV CATH: CPT

## 2022-01-01 PROCEDURE — 99498 ADVNCD CARE PLAN ADDL 30 MIN: CPT

## 2022-01-01 PROCEDURE — 84484 ASSAY OF TROPONIN QUANT: CPT

## 2022-01-01 PROCEDURE — 83735 ASSAY OF MAGNESIUM: CPT

## 2022-01-01 PROCEDURE — 99284 EMERGENCY DEPT VISIT MOD MDM: CPT

## 2022-01-01 PROCEDURE — 83036 HEMOGLOBIN GLYCOSYLATED A1C: CPT

## 2022-01-01 PROCEDURE — 93306 TTE W/DOPPLER COMPLETE: CPT

## 2022-01-01 PROCEDURE — 80076 HEPATIC FUNCTION PANEL: CPT

## 2022-01-01 PROCEDURE — 93005 ELECTROCARDIOGRAM TRACING: CPT

## 2022-01-01 PROCEDURE — 80048 BASIC METABOLIC PNL TOTAL CA: CPT

## 2022-01-01 PROCEDURE — C9113: CPT

## 2022-01-01 PROCEDURE — 93010 ELECTROCARDIOGRAM REPORT: CPT

## 2022-01-01 PROCEDURE — 74177 CT ABD & PELVIS W/CONTRAST: CPT | Mod: MD

## 2022-01-01 PROCEDURE — 93306 TTE W/DOPPLER COMPLETE: CPT | Mod: 26

## 2022-01-01 PROCEDURE — 70450 CT HEAD/BRAIN W/O DYE: CPT | Mod: 26

## 2022-01-01 PROCEDURE — 83690 ASSAY OF LIPASE: CPT

## 2022-01-01 PROCEDURE — 93970 EXTREMITY STUDY: CPT | Mod: 26

## 2022-01-01 PROCEDURE — 99233 SBSQ HOSP IP/OBS HIGH 50: CPT

## 2022-01-01 PROCEDURE — 99292 CRITICAL CARE ADDL 30 MIN: CPT | Mod: 25

## 2022-01-01 PROCEDURE — 70450 CT HEAD/BRAIN W/O DYE: CPT | Mod: MG

## 2022-01-01 PROCEDURE — 94003 VENT MGMT INPAT SUBQ DAY: CPT

## 2022-01-01 PROCEDURE — 80053 COMPREHEN METABOLIC PANEL: CPT

## 2022-01-01 PROCEDURE — 93970 EXTREMITY STUDY: CPT

## 2022-01-01 PROCEDURE — 71275 CT ANGIOGRAPHY CHEST: CPT | Mod: ME

## 2022-01-01 PROCEDURE — 99223 1ST HOSP IP/OBS HIGH 75: CPT

## 2022-01-01 PROCEDURE — 36600 WITHDRAWAL OF ARTERIAL BLOOD: CPT

## 2022-01-01 PROCEDURE — 82962 GLUCOSE BLOOD TEST: CPT

## 2022-01-01 PROCEDURE — 71275 CT ANGIOGRAPHY CHEST: CPT | Mod: MA

## 2022-01-01 PROCEDURE — 71275 CT ANGIOGRAPHY CHEST: CPT | Mod: 26,MA

## 2022-01-01 PROCEDURE — 80061 LIPID PANEL: CPT

## 2022-01-01 PROCEDURE — 86900 BLOOD TYPING SEROLOGIC ABO: CPT

## 2022-01-01 PROCEDURE — 86850 RBC ANTIBODY SCREEN: CPT

## 2022-01-01 PROCEDURE — 85027 COMPLETE CBC AUTOMATED: CPT

## 2022-01-01 PROCEDURE — 83605 ASSAY OF LACTIC ACID: CPT

## 2022-01-01 PROCEDURE — 84100 ASSAY OF PHOSPHORUS: CPT

## 2022-01-01 RX ORDER — HYDROMORPHONE HYDROCHLORIDE 2 MG/ML
1 INJECTION INTRAMUSCULAR; INTRAVENOUS; SUBCUTANEOUS
Refills: 0 | Status: DISCONTINUED | OUTPATIENT
Start: 2022-01-01 | End: 2022-01-01

## 2022-01-01 RX ORDER — SODIUM BICARBONATE 1 MEQ/ML
50 SYRINGE (ML) INTRAVENOUS ONCE
Refills: 0 | Status: COMPLETED | OUTPATIENT
Start: 2022-01-01 | End: 2022-01-01

## 2022-01-01 RX ORDER — METOPROLOL TARTRATE 50 MG
1 TABLET ORAL
Qty: 0 | Refills: 0 | DISCHARGE

## 2022-01-01 RX ORDER — FOLIC ACID 0.8 MG
1 TABLET ORAL
Qty: 0 | Refills: 0 | DISCHARGE

## 2022-01-01 RX ORDER — ATORVASTATIN CALCIUM 80 MG/1
20 TABLET, FILM COATED ORAL AT BEDTIME
Refills: 0 | Status: DISCONTINUED | OUTPATIENT
Start: 2022-01-01 | End: 2022-01-01

## 2022-01-01 RX ORDER — NOREPINEPHRINE BITARTRATE/D5W 8 MG/250ML
0.05 PLASTIC BAG, INJECTION (ML) INTRAVENOUS
Qty: 8 | Refills: 0 | Status: DISCONTINUED | OUTPATIENT
Start: 2022-01-01 | End: 2022-01-01

## 2022-01-01 RX ORDER — SODIUM CHLORIDE 9 MG/ML
1000 INJECTION, SOLUTION INTRAVENOUS
Refills: 0 | Status: DISCONTINUED | OUTPATIENT
Start: 2022-01-01 | End: 2022-01-01

## 2022-01-01 RX ORDER — DEXTROSE 50 % IN WATER 50 %
25 SYRINGE (ML) INTRAVENOUS ONCE
Refills: 0 | Status: DISCONTINUED | OUTPATIENT
Start: 2022-01-01 | End: 2022-01-01

## 2022-01-01 RX ORDER — PANTOPRAZOLE SODIUM 20 MG/1
40 TABLET, DELAYED RELEASE ORAL DAILY
Refills: 0 | Status: DISCONTINUED | OUTPATIENT
Start: 2022-01-01 | End: 2022-01-01

## 2022-01-01 RX ORDER — EPINEPHRINE 0.3 MG/.3ML
0.8 INJECTION INTRAMUSCULAR; SUBCUTANEOUS
Qty: 4 | Refills: 0 | Status: DISCONTINUED | OUTPATIENT
Start: 2022-01-01 | End: 2022-01-01

## 2022-01-01 RX ORDER — HEPARIN SODIUM 5000 [USP'U]/ML
5000 INJECTION INTRAVENOUS; SUBCUTANEOUS EVERY 12 HOURS
Refills: 0 | Status: DISCONTINUED | OUTPATIENT
Start: 2022-01-01 | End: 2022-01-01

## 2022-01-01 RX ORDER — ASPIRIN/CALCIUM CARB/MAGNESIUM 324 MG
1 TABLET ORAL
Qty: 0 | Refills: 0 | DISCHARGE

## 2022-01-01 RX ORDER — ACETAMINOPHEN 500 MG
1000 TABLET ORAL ONCE
Refills: 0 | Status: COMPLETED | OUTPATIENT
Start: 2022-01-01 | End: 2022-01-01

## 2022-01-01 RX ORDER — DEXTROSE 50 % IN WATER 50 %
12.5 SYRINGE (ML) INTRAVENOUS ONCE
Refills: 0 | Status: DISCONTINUED | OUTPATIENT
Start: 2022-01-01 | End: 2022-01-01

## 2022-01-01 RX ORDER — DEXTROSE 50 % IN WATER 50 %
15 SYRINGE (ML) INTRAVENOUS ONCE
Refills: 0 | Status: DISCONTINUED | OUTPATIENT
Start: 2022-01-01 | End: 2022-01-01

## 2022-01-01 RX ORDER — SODIUM CHLORIDE 9 MG/ML
1000 INJECTION INTRAMUSCULAR; INTRAVENOUS; SUBCUTANEOUS
Refills: 0 | Status: DISCONTINUED | OUTPATIENT
Start: 2022-01-01 | End: 2022-01-01

## 2022-01-01 RX ORDER — CHLORHEXIDINE GLUCONATE 213 G/1000ML
15 SOLUTION TOPICAL EVERY 12 HOURS
Refills: 0 | Status: DISCONTINUED | OUTPATIENT
Start: 2022-01-01 | End: 2022-01-01

## 2022-01-01 RX ORDER — HEPARIN SODIUM 5000 [USP'U]/ML
6000 INJECTION INTRAVENOUS; SUBCUTANEOUS ONCE
Refills: 0 | Status: COMPLETED | OUTPATIENT
Start: 2022-01-01 | End: 2022-01-01

## 2022-01-01 RX ORDER — GLIMEPIRIDE 1 MG
1 TABLET ORAL
Qty: 0 | Refills: 0 | DISCHARGE

## 2022-01-01 RX ORDER — PIOGLITAZONE HYDROCHLORIDE 15 MG/1
1 TABLET ORAL
Qty: 0 | Refills: 0 | DISCHARGE

## 2022-01-01 RX ORDER — INSULIN LISPRO 100/ML
10 VIAL (ML) SUBCUTANEOUS ONCE
Refills: 0 | Status: COMPLETED | OUTPATIENT
Start: 2022-01-01 | End: 2022-01-01

## 2022-01-01 RX ORDER — PHENYLEPHRINE HYDROCHLORIDE 10 MG/ML
0.1 INJECTION INTRAVENOUS
Qty: 160 | Refills: 0 | Status: DISCONTINUED | OUTPATIENT
Start: 2022-01-01 | End: 2022-01-01

## 2022-01-01 RX ORDER — HEPARIN SODIUM 5000 [USP'U]/ML
3000 INJECTION INTRAVENOUS; SUBCUTANEOUS EVERY 6 HOURS
Refills: 0 | Status: DISCONTINUED | OUTPATIENT
Start: 2022-01-01 | End: 2022-01-01

## 2022-01-01 RX ORDER — INSULIN GLARGINE 100 [IU]/ML
15 INJECTION, SOLUTION SUBCUTANEOUS EVERY MORNING
Refills: 0 | Status: DISCONTINUED | OUTPATIENT
Start: 2022-01-01 | End: 2022-01-01

## 2022-01-01 RX ORDER — SODIUM CHLORIDE 9 MG/ML
500 INJECTION INTRAMUSCULAR; INTRAVENOUS; SUBCUTANEOUS ONCE
Refills: 0 | Status: COMPLETED | OUTPATIENT
Start: 2022-01-01 | End: 2022-01-01

## 2022-01-01 RX ORDER — VASOPRESSIN 20 [USP'U]/ML
0.04 INJECTION INTRAVENOUS
Qty: 40 | Refills: 0 | Status: DISCONTINUED | OUTPATIENT
Start: 2022-01-01 | End: 2022-01-01

## 2022-01-01 RX ORDER — SODIUM BICARBONATE 1 MEQ/ML
0.23 SYRINGE (ML) INTRAVENOUS
Qty: 150 | Refills: 0 | Status: DISCONTINUED | OUTPATIENT
Start: 2022-01-01 | End: 2022-01-01

## 2022-01-01 RX ORDER — HEPARIN SODIUM 5000 [USP'U]/ML
6000 INJECTION INTRAVENOUS; SUBCUTANEOUS EVERY 6 HOURS
Refills: 0 | Status: DISCONTINUED | OUTPATIENT
Start: 2022-01-01 | End: 2022-01-01

## 2022-01-01 RX ORDER — MIDAZOLAM HYDROCHLORIDE 1 MG/ML
2 INJECTION, SOLUTION INTRAMUSCULAR; INTRAVENOUS ONCE
Refills: 0 | Status: DISCONTINUED | OUTPATIENT
Start: 2022-01-01 | End: 2022-01-01

## 2022-01-01 RX ORDER — EMPAGLIFLOZIN 10 MG/1
1 TABLET, FILM COATED ORAL
Qty: 0 | Refills: 0 | DISCHARGE

## 2022-01-01 RX ORDER — HEPARIN SODIUM 5000 [USP'U]/ML
INJECTION INTRAVENOUS; SUBCUTANEOUS
Qty: 25000 | Refills: 0 | Status: DISCONTINUED | OUTPATIENT
Start: 2022-01-01 | End: 2022-01-01

## 2022-01-01 RX ORDER — INSULIN LISPRO 100/ML
VIAL (ML) SUBCUTANEOUS EVERY 6 HOURS
Refills: 0 | Status: DISCONTINUED | OUTPATIENT
Start: 2022-01-01 | End: 2022-01-01

## 2022-01-01 RX ORDER — GLUCAGON INJECTION, SOLUTION 0.5 MG/.1ML
1 INJECTION, SOLUTION SUBCUTANEOUS ONCE
Refills: 0 | Status: DISCONTINUED | OUTPATIENT
Start: 2022-01-01 | End: 2022-01-01

## 2022-01-01 RX ORDER — ROBINUL 0.2 MG/ML
0.1 INJECTION INTRAMUSCULAR; INTRAVENOUS EVERY 6 HOURS
Refills: 0 | Status: DISCONTINUED | OUTPATIENT
Start: 2022-01-01 | End: 2022-01-01

## 2022-01-01 RX ORDER — ASPIRIN/CALCIUM CARB/MAGNESIUM 324 MG
325 TABLET ORAL DAILY
Refills: 0 | Status: DISCONTINUED | OUTPATIENT
Start: 2022-01-01 | End: 2022-01-01

## 2022-01-01 RX ORDER — NOREPINEPHRINE BITARTRATE/D5W 8 MG/250ML
0.3 PLASTIC BAG, INJECTION (ML) INTRAVENOUS
Qty: 16 | Refills: 0 | Status: DISCONTINUED | OUTPATIENT
Start: 2022-01-01 | End: 2022-01-01

## 2022-01-01 RX ADMIN — Medication 7.5 MICROGRAM(S)/KG/MIN: at 20:26

## 2022-01-01 RX ADMIN — CHLORHEXIDINE GLUCONATE 15 MILLILITER(S): 213 SOLUTION TOPICAL at 06:27

## 2022-01-01 RX ADMIN — HEPARIN SODIUM 700 UNIT(S)/HR: 5000 INJECTION INTRAVENOUS; SUBCUTANEOUS at 18:53

## 2022-01-01 RX ADMIN — HEPARIN SODIUM 0 UNIT(S)/HR: 5000 INJECTION INTRAVENOUS; SUBCUTANEOUS at 10:59

## 2022-01-01 RX ADMIN — Medication 125 MEQ/KG/HR: at 20:26

## 2022-01-01 RX ADMIN — INSULIN GLARGINE 15 UNIT(S): 100 INJECTION, SOLUTION SUBCUTANEOUS at 09:55

## 2022-01-01 RX ADMIN — Medication 8: at 11:46

## 2022-01-01 RX ADMIN — Medication 12: at 06:11

## 2022-01-01 RX ADMIN — HEPARIN SODIUM 1300 UNIT(S)/HR: 5000 INJECTION INTRAVENOUS; SUBCUTANEOUS at 20:26

## 2022-01-01 RX ADMIN — Medication 21.1 MICROGRAM(S)/KG/MIN: at 08:53

## 2022-01-01 RX ADMIN — Medication 325 MILLIGRAM(S): at 09:47

## 2022-01-01 RX ADMIN — PHENYLEPHRINE HYDROCHLORIDE 1.4 MICROGRAM(S)/KG/MIN: 10 INJECTION INTRAVENOUS at 13:27

## 2022-01-01 RX ADMIN — ATORVASTATIN CALCIUM 20 MILLIGRAM(S): 80 TABLET, FILM COATED ORAL at 21:19

## 2022-01-01 RX ADMIN — MIDAZOLAM HYDROCHLORIDE 2 MILLIGRAM(S): 1 INJECTION, SOLUTION INTRAMUSCULAR; INTRAVENOUS at 01:34

## 2022-01-01 RX ADMIN — SODIUM CHLORIDE 1000 MILLILITER(S): 9 INJECTION INTRAMUSCULAR; INTRAVENOUS; SUBCUTANEOUS at 18:30

## 2022-01-01 RX ADMIN — CHLORHEXIDINE GLUCONATE 15 MILLILITER(S): 213 SOLUTION TOPICAL at 20:26

## 2022-01-01 RX ADMIN — Medication 21.1 MICROGRAM(S)/KG/MIN: at 15:30

## 2022-01-01 RX ADMIN — VASOPRESSIN 6 UNIT(S)/MIN: 20 INJECTION INTRAVENOUS at 08:57

## 2022-01-01 RX ADMIN — CHLORHEXIDINE GLUCONATE 15 MILLILITER(S): 213 SOLUTION TOPICAL at 21:28

## 2022-01-01 RX ADMIN — HEPARIN SODIUM 700 UNIT(S)/HR: 5000 INJECTION INTRAVENOUS; SUBCUTANEOUS at 21:29

## 2022-01-01 RX ADMIN — Medication 8: at 00:10

## 2022-01-01 RX ADMIN — PANTOPRAZOLE SODIUM 40 MILLIGRAM(S): 20 TABLET, DELAYED RELEASE ORAL at 09:47

## 2022-01-01 RX ADMIN — Medication 21.1 MICROGRAM(S)/KG/MIN: at 21:29

## 2022-01-01 RX ADMIN — HEPARIN SODIUM 0 UNIT(S)/HR: 5000 INJECTION INTRAVENOUS; SUBCUTANEOUS at 03:24

## 2022-01-01 RX ADMIN — SODIUM CHLORIDE 1000 MILLILITER(S): 9 INJECTION INTRAMUSCULAR; INTRAVENOUS; SUBCUTANEOUS at 19:23

## 2022-01-01 RX ADMIN — Medication 7.5 MICROGRAM(S)/KG/MIN: at 08:38

## 2022-01-01 RX ADMIN — Medication 50 MILLIEQUIVALENT(S): at 13:12

## 2022-01-01 RX ADMIN — Medication 7.5 MICROGRAM(S)/KG/MIN: at 18:20

## 2022-01-01 RX ADMIN — Medication 325 MILLIGRAM(S): at 21:19

## 2022-01-01 RX ADMIN — Medication 125 MEQ/KG/HR: at 16:17

## 2022-01-01 RX ADMIN — CHLORHEXIDINE GLUCONATE 15 MILLILITER(S): 213 SOLUTION TOPICAL at 06:12

## 2022-01-01 RX ADMIN — VASOPRESSIN 6 UNIT(S)/MIN: 20 INJECTION INTRAVENOUS at 20:36

## 2022-01-01 RX ADMIN — EPINEPHRINE 240 MICROGRAM(S)/KG/MIN: 0.3 INJECTION INTRAMUSCULAR; SUBCUTANEOUS at 14:57

## 2022-01-01 RX ADMIN — Medication 50 MILLIEQUIVALENT(S): at 13:13

## 2022-01-01 RX ADMIN — ATORVASTATIN CALCIUM 20 MILLIGRAM(S): 80 TABLET, FILM COATED ORAL at 21:28

## 2022-01-01 RX ADMIN — HEPARIN SODIUM 1100 UNIT(S)/HR: 5000 INJECTION INTRAVENOUS; SUBCUTANEOUS at 07:24

## 2022-01-01 RX ADMIN — INSULIN GLARGINE 15 UNIT(S): 100 INJECTION, SOLUTION SUBCUTANEOUS at 05:45

## 2022-01-01 RX ADMIN — SODIUM CHLORIDE 500 MILLILITER(S): 9 INJECTION INTRAMUSCULAR; INTRAVENOUS; SUBCUTANEOUS at 19:23

## 2022-01-01 RX ADMIN — HEPARIN SODIUM 900 UNIT(S)/HR: 5000 INJECTION INTRAVENOUS; SUBCUTANEOUS at 12:02

## 2022-01-01 RX ADMIN — HYDROMORPHONE HYDROCHLORIDE 1 MILLIGRAM(S): 2 INJECTION INTRAMUSCULAR; INTRAVENOUS; SUBCUTANEOUS at 11:59

## 2022-01-01 RX ADMIN — PANTOPRAZOLE SODIUM 40 MILLIGRAM(S): 20 TABLET, DELAYED RELEASE ORAL at 10:31

## 2022-01-01 RX ADMIN — ROBINUL 0.1 MILLIGRAM(S): 0.2 INJECTION INTRAMUSCULAR; INTRAVENOUS at 11:59

## 2022-01-01 RX ADMIN — HEPARIN SODIUM 700 UNIT(S)/HR: 5000 INJECTION INTRAVENOUS; SUBCUTANEOUS at 08:54

## 2022-01-01 RX ADMIN — PHENYLEPHRINE HYDROCHLORIDE 1.4 MICROGRAM(S)/KG/MIN: 10 INJECTION INTRAVENOUS at 21:28

## 2022-01-01 RX ADMIN — HEPARIN SODIUM 700 UNIT(S)/HR: 5000 INJECTION INTRAVENOUS; SUBCUTANEOUS at 18:57

## 2022-01-01 RX ADMIN — Medication 325 MILLIGRAM(S): at 10:31

## 2022-01-01 RX ADMIN — HEPARIN SODIUM 1100 UNIT(S)/HR: 5000 INJECTION INTRAVENOUS; SUBCUTANEOUS at 04:25

## 2022-01-01 RX ADMIN — Medication 125 MEQ/KG/HR: at 21:28

## 2022-01-01 RX ADMIN — Medication 1000 MILLIGRAM(S): at 11:30

## 2022-01-01 RX ADMIN — HEPARIN SODIUM 700 UNIT(S)/HR: 5000 INJECTION INTRAVENOUS; SUBCUTANEOUS at 01:51

## 2022-01-01 RX ADMIN — Medication 10 UNIT(S): at 06:13

## 2022-01-01 RX ADMIN — Medication 125 MEQ/KG/HR: at 09:23

## 2022-01-01 RX ADMIN — Medication 8: at 19:24

## 2022-01-01 RX ADMIN — VASOPRESSIN 6 UNIT(S)/MIN: 20 INJECTION INTRAVENOUS at 21:29

## 2022-01-01 RX ADMIN — Medication 10: at 00:01

## 2022-01-01 RX ADMIN — Medication 400 MILLIGRAM(S): at 10:55

## 2022-01-01 RX ADMIN — Medication 10: at 05:44

## 2022-01-01 RX ADMIN — Medication 7.5 MICROGRAM(S)/KG/MIN: at 12:15

## 2022-03-12 PROBLEM — E78.5 HYPERLIPIDEMIA, UNSPECIFIED: Chronic | Status: ACTIVE | Noted: 2020-05-29

## 2022-03-12 PROBLEM — E11.9 TYPE 2 DIABETES MELLITUS WITHOUT COMPLICATIONS: Chronic | Status: ACTIVE | Noted: 2020-05-29

## 2022-03-12 PROBLEM — M10.9 GOUT, UNSPECIFIED: Chronic | Status: ACTIVE | Noted: 2020-05-29

## 2022-03-12 PROBLEM — C50.929 MALIGNANT NEOPLASM OF UNSPECIFIED SITE OF UNSPECIFIED MALE BREAST: Chronic | Status: ACTIVE | Noted: 2020-05-29

## 2022-03-12 PROBLEM — I48.91 UNSPECIFIED ATRIAL FIBRILLATION: Chronic | Status: ACTIVE | Noted: 2020-05-29

## 2022-03-12 NOTE — ED PROVIDER NOTE - CLINICAL SUMMARY MEDICAL DECISION MAKING FREE TEXT BOX
Labs and imaging will be obtained. Labs and imaging ordered, labs are  non actionable, imaging is pending at this time, pt will be signed out to the oncoming physician for re-evaluation and final disposition.

## 2022-03-12 NOTE — ED PROVIDER NOTE - OBJECTIVE STATEMENT
82 y/o male with a PMHx of Afib on ASA, breast cancer, HLD, DM, gout presents to the ED with abdominal pain, radiating to the b/l rib x few days, intermittently worse with deep breathing. No nausea, no vomiting, no SOB, no chest pain. Pt is on Tamoxifen. Pt was sent by Onc Dr. Donohue for eval of PE.

## 2022-03-12 NOTE — ED PROVIDER NOTE - PROGRESS NOTE DETAILS
pt signed out to me pending CT. CT negative for dissection, PE. discussed incidental findings with patient - states he has an appointment with Dr. Donohue on Wed and will discuss results with him. States he feels better and hasn't had pain for a few hours now. abd soft non-tender. pt points to b/l lateral chest wall when describing where pain was. I recommended 2nd trop however pt does not want to stay for this test. Family at bedside agrees with patient they want to go home. I discussed that a 2nd trop is the best way to rule out heart attack. pt and family understand they state they do not think it's his heart. Based on patients history and physical I agree unlikely/atypical for acs. no tele events. strict return precautions discussed. Jon Torres M.D., Attending Physician

## 2022-03-12 NOTE — ED PROVIDER NOTE - NS ED ROS FT
Constitutional: No fever or chills  Eyes: No visual changes  HEENT: No throat pain  CV: No chest pain  Resp: No SOB no cough  GI: + abd pain, no nausea or vomiting  : No dysuria  MSK: +b/l rib pain  Skin: No rash  Neuro: No headache

## 2022-03-12 NOTE — ED PROVIDER NOTE - PATIENT PORTAL LINK FT
You can access the FollowMyHealth Patient Portal offered by [facility] by registering at the following website: [email or web address]. By joining [employer]’s FollowMyHealth portal, you will also be able to view your health information using other applications (apps) compatible with our system.

## 2022-03-12 NOTE — ED ADULT TRIAGE NOTE - CHIEF COMPLAINT QUOTE
Pt. to the ED C/O Generalized Abdominal Pain for a few days- Oncologist sending patient to the ED to R/O PE/ Blood Clots due to Tamoxifen --Denies CP and SOB--- Hx of Breast Ca

## 2022-03-12 NOTE — ED ADULT NURSE NOTE - OBJECTIVE STATEMENT
pt came to ED for evaluation of LUQ and left flank pain. denies N/V. c/o sharp pain with deep inspiration . denies any other pain or complaints

## 2022-03-12 NOTE — ED PROVIDER NOTE - NSICDXPASTMEDICALHX_GEN_ALL_CORE_FT
PAST MEDICAL HISTORY:  Atrial fibrillation 1 episode in 2014    Breast cancer in male Right breast (dx 2020)    Diabetes     Gout     HLD (hyperlipidemia)

## 2022-03-12 NOTE — ED STATDOCS - PROGRESS NOTE DETAILS
Kaitlynn Vazquez: 81 M hx Afib, breast cancer, HLD, DM, gout here c/o abdominal pain x few days.  states a few days ago he had pain under his left armpit which self resolved. reports he has pain when taking a deep breath. no fever, n/v/d. oncologist sent pt in to r/o PE / blood clot due to Tamoxifen.  oncologist: Dr. Donohue. Will send pt to main ED for further evaluation.

## 2022-03-12 NOTE — ED PROVIDER NOTE - NSFOLLOWUPINSTRUCTIONS_ED_ALL_ED_FT
1. return for worsening symptoms or anything concerning to you  2. take all home meds as prescribed  3. follow up with your pmd call to make an appointment  4. follow up regarding incidental findings see attached  5. follow up with Dr. Donohue on Wed    Chest Pain    WHAT YOU NEED TO KNOW:    Chest pain can be caused by a range of conditions, from not serious to life-threatening. Chest pain can be a symptom of a digestive problem, such as acid reflux or a stomach ulcer. An anxiety attack or a strong emotion, such as anger, can also cause chest pain. Infection, inflammation, or a fracture in the bones or cartilage in your chest can cause pain or discomfort. Sometimes chest pain or pressure is caused by poor blood flow to your heart (angina). Chest pain may also be caused by life-threatening conditions such as a heart attack or blood clot in your lungs.     DISCHARGE INSTRUCTIONS:    Call 911 if:     You have any of the following signs of a heart attack:   Squeezing, pressure, or pain in your chest       and any of the following:   Discomfort or pain in your back, neck, jaw, stomach, or arm       Shortness of breath      Nausea or vomiting      Lightheadedness or a sudden cold sweat        Return to the emergency department if:     You have chest discomfort that gets worse, even with medicine.      You cough or vomit blood.       Your bowel movements are black or bloody.       You cannot stop vomiting, or it hurts to swallow.     Contact your healthcare provider if:     You have questions or concerns about your condition or care.        Medicines:     Medicines may be given to treat the cause of your chest pain. Examples include pain medicine, anxiety medicine, or medicines to increase blood flow to your heart.       Do not take certain medicines without asking your healthcare provider first. These include NSAIDs, herbal or vitamin supplements, or hormones (estrogen or progestin).       Take your medicine as directed. Contact your healthcare provider if you think your medicine is not helping or if you have side effects. Tell him or her if you are allergic to any medicine. Keep a list of the medicines, vitamins, and herbs you take. Include the amounts, and when and why you take them. Bring the list or the pill bottles to follow-up visits. Carry your medicine list with you in case of an emergency.    Follow up with your healthcare provider within 72 hours, or as directed: You may need to return for more tests to find the cause of your chest pain. You may be referred to a specialist, such as a cardiologist or gastroenterologist. Write down your questions so you remember to ask them during your visits.     Healthy living tips: The following are general healthy guidelines. If your chest pain is caused by a heart problem, your healthcare provider will give you specific guidelines to follow.    Do not smoke. Nicotine and other chemicals in cigarettes and cigars can cause lung and heart damage. Ask your healthcare provider for information if you currently smoke and need help to quit. E-cigarettes or smokeless tobacco still contain nicotine. Talk to your healthcare provider before you use these products.       Eat a variety of healthy, low-fat, low-salt foods. Healthy foods include fruits, vegetables, whole-grain breads, low-fat dairy products, beans, lean meats, and fish. Ask for more information about a heart healthy diet.      Drink plenty of water every day. Your body is made of mostly water. Water helps your body to control your temperature and blood pressure. Ask your healthcare provider how much water you should drink every day.      Ask about activity. Your healthcare provider will tell you which activities to limit or avoid. Ask when you can drive, return to work, and have sex. Ask about the best exercise plan for you.      Maintain a healthy weight. Ask your healthcare provider how much you should weigh. Ask him or her to help you create a weight loss plan if you are overweight.       Get the flu and pneumonia vaccines. All adults should get the influenza (flu) vaccine. Get it every year as soon as it becomes available. The pneumococcal vaccine is given to adults aged 65 years or older. The vaccine is given every 5 years to prevent pneumococcal disease, such as pneumonia.    If you have a stent:     Carry your stent card with you at all times.       Let all healthcare providers know that you have a stent.

## 2022-08-03 NOTE — ASU PATIENT PROFILE, ADULT - TEACHING/LEARNING LEARNING PREFERENCES
- No ICD indication      - no VT      - has no children  - hydration emphasized  - w/u has reduced gradient likelihood  - recent MRI did not indicate sig scar burden  - no primary prev ICD indicated  
 - continue warfarin to INR 2 to 3 - given Afib and HCM  CHADSVASC in addition to the HCM indication is upto 3 for age, HTN and CAD on CTA 4/2021 though cath had less disease  - OK to hold  Prior to prostate biopsy procedures as required 5 days to 1 week before    - his warfarin levels and INRs have been difficult to control he states - donna obtain    - he has anemia which corrected on most recent HcT ~40 on 7/7;  If RECURRENCES OF ANEMIA    - d/w him - with major bleeding and/or need for an alternative to anticoagulation, I discussed with patient option undergoing a percutaneous left atrial appendage occlusion device known as WATCHMAN. This would allow discontinuation of anticoagulation typically 45 days after the procedure in over 96% of patients. A total of ~3-4% risk includes approximately 1% pericardial effusion, 1% stroke, 1 out of 400 risk of device dislodgment or other requiring cardiothoracic surgical intervention, 1 out of 1000 risk of death and imponderables were discussed with patient.     This option is most preferred as the patient will continue to require anticoagulation and take its risks indefinitely otherwise.     - to proceed FDA/CMS guidelines require a shared decision making process additional non WATCHMAN intervening physician to present the risks and benefits of anticoagulation to the patient using patient centered tool available athttp://[employer].org/AFibDecisionAids (select VERY HIGH RISK pdf) or http://[employer].org/SDMAFib. We will send the forms to be completed to second physician typically PCP or cardiologist.   - prior to procedure scheduling a JAM and/or a CT with contrast is required for anatomy evaluation       - IF HE HAS ONGOING ANEMIA NOW DUE TO WARFARIN - DISCUSSED WITH HIM CONSIDERING WATCHMAN AGAIN.  Check for qualificatin  
 - off amio since 8/2021     - given AV pauses - will remain off though AF/AT/AFl has recurred now    
- amio was stopped 8/2021and HR improved  - if needs amio for symptomatic rhythm control again then would have to consider pacemaker  
- d/w him safety      - adequate hydration  - no additional episodes since prior visit      -- declined ILR  
- exam unchanged  - severe LA dilation noted  
- improved - ?anemia  
- last episode 11/2021 with hosp in setting of hypoglycemia it seems  - he continues to have some near faint spells and the monitor showed AV pause during nocturnal hours but he states he was awake and asymptomaic  - he also believes anemia may be a factor in recent dizziness  - ILR is recurs    
- no suggestion of definite recurrence with multiple monitors on a very low dose of amio - off amio since 8/2021 visit and now recurrence is suggested    · 4/14/17- PVI redo and left atrial linear lesions and right atrial tachycardia ablation and cavotricuspid isthmus ablation      11/4/2009- Aflutter - start amio      5/7/2009 - PVI + roof ablation + mitral isthmus ablation  - recurrent aflutter thereafter                12/2007 - persistent AF- Norpace loaded and cardioverted               10/2006 - PAF  ·     - now possible AT's noted on the Holter -he seems to not be symptomatic from this  - 5/2022 treadmill to see if sytmptoms correlate with rate issues in AT/AFl or sinus - which did not seem to be case   - if he requires AF/AT suppression with amio then would porbably need pacemaker given the duration of pause        
9/1999 - s/p MV ring  - now being reevaluated for degree of MR due to FABIENNE and MS  
· 4/2017 atypical and typical atrial flutter ablation  - due to low P wave amplitude cannot discern aflutter from AFon last ECG.  Some repetition of RR intervals noted  
· Hx: 4/14/17- PVI redo and left atrial linear lesions and right atrial tachycardia ablation and cavotricuspid isthmus ablation.      2009 - LA Roof + PVI       · - off low dose amio now has recurrence but tolerating symptomatically  
written material/verbal instruction

## 2022-12-26 NOTE — H&P ADULT - NSHPLABSRESULTS_GEN_ALL_CORE
CBC Full  -  ( 26 Dec 2022 12:23 )  WBC Count : 10.97 K/uL  RBC Count : 4.12 M/uL  Hemoglobin : 11.3 g/dL  Hematocrit : 40.0 %  Platelet Count - Automated : 153 K/uL  Mean Cell Volume : 97.1 fl  Mean Cell Hemoglobin : 27.4 pg  Mean Cell Hemoglobin Concentration : 28.3 gm/dL  Auto Neutrophil # : 7.46 K/uL  Auto Lymphocyte # : 3.07 K/uL  Auto Monocyte # : 0.22 K/uL  Auto Eosinophil # : 0.11 K/uL  Auto Basophil # : 0.00 K/uL  Auto Neutrophil % : 66.0 %  Auto Lymphocyte % : 28.0 %  Auto Monocyte % : 2.0 %  Auto Eosinophil % : 1.0 %  Auto Basophil % : 0.0 %      12-26    141  |  106  |  22  ----------------------------<  448<H>  4.0   |  12<L>  |  1.64<H>    Ca    9.3      26 Dec 2022 12:23  Mg     2.4     12-26    TPro  5.9<L>  /  Alb  2.2<L>  /  TBili  0.2  /  DBili  x   /  AST  129<H>  /  ALT  112<H>  /  AlkPhos  69  12-26

## 2022-12-26 NOTE — PATIENT PROFILE ADULT - FALL HARM RISK - HARM RISK INTERVENTIONS

## 2022-12-26 NOTE — ED PROVIDER NOTE - PROGRESS NOTE DETAILS
Kaitlynn Nagel  Discussed with PCI Cardiologist attending Dr Gee, EKG A fib with lateral ST depression, not PCI candidate. Recommends medical management in ICU. Kaitlynn Nagel   Discussed with ICU DENY Forbes, will consult and admit to ICU.

## 2022-12-26 NOTE — ED ADULT NURSE NOTE - OBJECTIVE STATEMENT
Pt BIBA intubated with CPR in progress.  Interventions per code flowsheet. Pt remains unresponsive throughout. Pt report to CT scan with bedside report given to ICU RN.

## 2022-12-26 NOTE — CONSULT NOTE ADULT - SUBJECTIVE AND OBJECTIVE BOX
82y old  Male who presents with a witnessed seizure and cardiac arrest. Asystole, bradycardia in field. CPR X 45 minutes and ROSC. Intubated and on pressors via IV drip. EKG shoed AF and ST depressions. ER MD called consult for possible ACS.  No c/o chest pain today before the episode. Recently with shortness of breath since CABG 6 wks ago at [facility]      PAST MEDICAL & SURGICAL HISTORY:  Gout  Diabetes  HLD (hyperlipidemia)  Breast cancer in male Right breast (dx )  Atrial fibrillation 1 episode in   CAD< recent CABG  H/O colonoscopy between 5 to 10 yrs ago, ( to ). Was negative      PREVIOUS CARDIAC WORKUP:  Not available.       ALLERGIES:    No Known Allergies       MEDICATIONS  (STANDING):  aspirin 325 milliGRAM(s) Oral daily  atorvastatin 20 milliGRAM(s) Oral at bedtime  chlorhexidine 0.12% Liquid 15 milliLiter(s) Oral Mucosa every 12 hours  dextrose 5%. 1000 milliLiter(s) (50 mL/Hr) IV Continuous <Continuous>  dextrose 5%. 1000 milliLiter(s) (100 mL/Hr) IV Continuous <Continuous>  dextrose 50% Injectable 25 Gram(s) IV Push once  dextrose 50% Injectable 12.5 Gram(s) IV Push once  dextrose 50% Injectable 25 Gram(s) IV Push once  glucagon  Injectable 1 milliGRAM(s) IntraMuscular once  heparin   Injectable 5000 Unit(s) SubCutaneous every 12 hours  insulin lispro (ADMELOG) corrective regimen sliding scale   SubCutaneous every 6 hours  norepinephrine Infusion 0.05 MICROgram(s)/kG/Min (7.5 mL/Hr) IV Continuous <Continuous>  pantoprazole  Injectable 40 milliGRAM(s) IV Push daily  sodium bicarbonate  Infusion 0.234 mEq/kG/Hr (125 mL/Hr) IV Continuous <Continuous>    MEDICATIONS  (PRN):  dextrose Oral Gel 15 Gram(s) Oral once PRN Blood Glucose LESS THAN 70 milliGRAM(s)/deciliter      FAMILY HISTORY:   NC        SOCIAL HISTORY:  Nonsmoker. No ETOH abuse. No illicit drugs.     ROS: Not obtainable.      Vital Signs Last 24 Hrs  T(C): 36.6 (26 Dec 2022 12:20), Max: 36.6 (26 Dec 2022 12:20)  T(F): 97.9 (26 Dec 2022 12:20), Max: 97.9 (26 Dec 2022 12:20)  HR: 81 (26 Dec 2022 13:15) (72 - 103)  BP: 139/81 (26 Dec 2022 12:48) (78/52 - 139/81)  BP(mean): 98 (26 Dec 2022 12:48) (61 - 98)  RR: 14  SpO2: 98% (26 Dec 2022 13:15) (97% - 100%)    Parameters below as of 26 Dec 2022 12:48  Patient On (Oxygen Delivery Method): ventilator        PHYSICAL EXAM:    General:                Pale On mechanical vent.   HEENT:                  Orally intubated. Pupils are fixed  Neck:                     Supple, +JVD.  Chest:                    Clear, B/L symmetric air entry, no tachypnea  Heart:                     S1, S2 irregular, + murmur.  Abdomen:              Soft, non-tender, bowel sounds active. No palpable masses.  Extremities:           no cyanosis, no edema.   Neurologic:            No spontaneous movement seen       TELEMETRY:   AF    ECG:   AF, RVR, RBBB, Inferior Q waves. lateral ST depressions.     LABS:                          11.3   10.97 )-----------( 153      ( 26 Dec 2022 12:23 )             40.0         141  |  106  |  22  ----------------------------<  448<H>  4.0   |  12<L>  |  1.64<H>    Ca    9.3      26 Dec 2022 12:23  Mg     2.4         TPro  5.9<L>  /  Alb  2.2<L>  /  TBili  0.2  /  DBili  x   /  AST  129<H>  /  ALT  112<H>  /  AlkPhos  69   @ 12:23  Trop-I  61.16    Venous pH 6.8    Lactate 17.4        Pro BNP  803  @ 12:23  D Dimer  --  @ 12:23      PT/INR - ( 26 Dec 2022 12:23 )   PT: 20.2 sec;   INR: 1.73 ratio    PTT - ( 26 Dec 2022 12:23 )  PTT:75.5 sec      Urinalysis Basic - ( 26 Dec 2022 13:15 )    Color: Yellow / Appearance: very cloudy / S.015 / pH: x  Gluc: x / Ketone: Negative  / Bili: Negative / Urobili: Negative   Blood: x / Protein: 100 / Nitrite: Negative   Leuk Esterase: Negative / RBC: 11-25 /HPF / WBC 0-2 /HPF   Sq Epi: x / Non Sq Epi: Few / Bacteria: Moderate        RADIOLOGY & ADDITIONAL STUDIES:    CT chest - LLL infiltrate and effusion.

## 2022-12-26 NOTE — H&P ADULT - NSHPPHYSICALEXAM_GEN_ALL_CORE
General pale  HEENT orally intubated  neck + JVD  lungs clear, cxr et tube good position  cv rrr  abdomen soft, non tender  extremities cool  neuro pupils fixed dilated    EKG wide complex, afib, st down laterally

## 2022-12-26 NOTE — H&P ADULT - HISTORY OF PRESENT ILLNESS
This patient is an 82 year old Male     PMH sign For CAD     Afib in past    Type II diabetes     brest cancer 2020    had bpass surgery 6 weeks ago at [facility], he had been sob since than  Today he had seizurelike motions in front of wife, who called EMS  pateint ahd cpr for 45 minutes, had pulse on arrival to ED  after arrest pateint on Epinephrine drip at 1 mcg/kg/min  PH< 6.8  Pupils non reactive

## 2022-12-26 NOTE — ED PROVIDER NOTE - CLINICAL SUMMARY MEDICAL DECISION MAKING FREE TEXT BOX
pt with cardiac arrest now with pulse and bp after epi, will start epi drip and discussed with cardio and icu. per EMS wife stated pt is full code. cardiac labs sent, sepsis labs.

## 2022-12-26 NOTE — ED PROVIDER NOTE - PHYSICAL EXAMINATION
Constitutional: unresponsive  Eyes: fixed and dilated  Head: Normocephalic atraumatic  Mouth: MMM  Cardiac: regular rate and rhythm  Resp: Lungs CTAB with BVM  GI: Abd s/nd/nt  Neuro: GCS 3  Skin: No visible rashes

## 2022-12-26 NOTE — PROCEDURE NOTE - ADDITIONAL PROCEDURE DETAILS
Dx: shock  indication: need for hemodynamic monitoring  details:    dynamic US throughout  sterile  image obtained  1st stick

## 2022-12-26 NOTE — ED ADULT TRIAGE NOTE - CHIEF COMPLAINT QUOTE
Pt arrives to ED s/p witnessed cardiac arrest. Pt given four rounds epinephrine and intubated in field by EMS. Pt in and out of asystole during transport.

## 2022-12-26 NOTE — ED PROVIDER NOTE - OBJECTIVE STATEMENT
83 y/o male with hx of A fib on ASA, s/p CABG 6 weeks ago presents to the ED BIBEMS s/p witnessed cardiac arrest after collapsing infront of wife. Pt was maria guadalupe when EMS got there. Wife saw seizure activity prior to arrest, so she called EMS. Pt was consistently c/o SOB due to CABG 6 weeks ago. Per EMS, pt was given six rounds of epi and a round of bicarb prior to the arrest. Pt was intubated in field by EMS and had good blood pressure throughout transport. No head trauma per EMS.

## 2022-12-27 NOTE — PROVIDER CONTACT NOTE (EICU) - BACKGROUND
Noted Vent settings and orders not matching, order placed to match  as per guidelines will place order for SAT/SBT if candidate

## 2022-12-27 NOTE — DIETITIAN INITIAL EVALUATION ADULT - ENTERAL
Initiate Glucerna 1.5 @ 20 cc/hr, increase by 10 cc/hr q8 hours until goal rate of 65 cc/hr (total volume 1300 mL) met with 2 packets of Prosource TF. Will provide ~ 2030 kcal, 130 g protein, and 988 mL free water. Consider free water flush of 40 cc/hr (provides an additional 800 mL daily); adjust prn to maintain hydration as per MD.

## 2022-12-27 NOTE — DIETITIAN INITIAL EVALUATION ADULT - OTHER INFO
82 year old Male pmhx for CAD, afib, T2DM - on Glimepiride at home, breast cancer 2020. had bypass surgery 6 weeks ago at [facility], he had been sob since then. Today he had seizurelike motions in front of wife, who called EMS. Pt intubated, palliative care consult pending. Not on any sedation. Is unresponsive. POCT elevated, will start TF today. NFPE reveals mod to severe muscle/ fat wasting; meets criteria for PCM. RD took bed scale wt on 12/27 at 160#. Strongly rec'd to Confirm goals of care regarding LONG-TERM nutrition support. See recs below.

## 2022-12-27 NOTE — DIETITIAN INITIAL EVALUATION ADULT - ADD RECOMMEND
1) initiate TF rx to above, 2) Monitor tolerance; maintain aspiration precautions, back of bed >45 degrees, 3) daily wts to track/trend changes, 4) monitor lytes/ min and replete prn, 5) Monitor bowel movements, if no BM for >3 days, consider implementing bowel regimen, 6) Obtain vitamin D 25OH level to assess nutriture, 7) Confirm goals of care regarding LONG-TERM nutrition support. RD will continue to monitor TF tolerance, labs, hydration, and wt prn.

## 2022-12-27 NOTE — CONSULT NOTE ADULT - CONVERSATION DETAILS
Long discussion with family at this stage, we are recommending comfort measures as patient continues to decline regardless of maximal support.  We discussed prolonging suffering rather than prolonging life.     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 something at this stage that is likely lost with no signfciant chances of regaining.     COMFORT and Hospice discussed    We discussed what that would mean,  no longer doing blood tests, dx imaging, abx, and palliative extubation to RA with comfort medications to address any symptoms that may arise. This would mean shortened life span but would focus on comfort and quality at the end of life.     Theyd like DNR Today and tomorrow morning wife will come in to be present for weaning and de-escelation of care

## 2022-12-27 NOTE — DIETITIAN INITIAL EVALUATION ADULT - [id number]_GEN_A_CORE
I&O's Detail    26 Dec 2022 07:01  -  27 Dec 2022 07:00  --------------------------------------------------------  IN:    Heparin Infusion: 99 mL    Norepinephrine: 687 mL    Sodium Bicarbonate: 1579 mL    sodium chloride 0.9%: 1000 mL    Sodium Chloride 0.9% Bolus: 1000 mL    Vasopressin: 52 mL  Total IN: 4417 mL    OUT:    Indwelling Catheter - Urethral (mL): 675 mL  Total OUT: 675 mL    Total NET: 3742 mL      27 Dec 2022 07:01  -  27 Dec 2022 14:31  --------------------------------------------------------  IN:    IV PiggyBack: 100 mL  Total IN: 100 mL    OUT:    Indwelling Catheter - Urethral (mL): 280 mL  Total OUT: 280 mL    Total NET: -180 mL

## 2022-12-27 NOTE — CONSULT NOTE ADULT - SUBJECTIVE AND OBJECTIVE BOX
HPI:       Pt  is a 82y old Male with hx  CAD sp bipass 6 weeks ago, Afib, DM II, BrCA .   He's had bpass surgery 6 weeks ago at [facility], he had been sob since than  day of admission he had seizurelike motions in front of wife, who called EMS  pateint ahd cpr for 45 minutes, had pulse on arrival to ED  - PH< 6.8  Pupils non reactive              PAIN: ( )Yes   ( )No  Pt unable to characterise d/t clinical status     DYSPNEA: ( ) Yes  ( ) No  Level: vented    PAST MEDICAL & SURGICAL HISTORY:  Gout      Diabetes      HLD (hyperlipidemia)      Breast cancer in male  Right breast (dx )      Atrial fibrillation  1 episode in       H/O colonoscopy  between 5 to 10 yrs ago, ( to ). Was negative      History of mastectomy, bilateral  2018      S/P CABG x 3  6 weeks ago          SOCIAL HX:    Hx opiate tolerance ( )YES  (x )NO    Baseline ADLs  (Prior to Admission)  ( ) Independent   (x )Dependent    FAMILY HISTORY:      Review of Systems:     Unable to obtain/Limited due to: AMS      PHYSICAL EXAM:    Vital Signs Last 24 Hrs  T(C): 38.2 (27 Dec 2022 08:20), Max: 38.2 (27 Dec 2022 08:20)  T(F): 100.7 (27 Dec 2022 08:20), Max: 100.7 (27 Dec 2022 08:20)  HR: 103 (27 Dec 2022 09:00) (72 - 128)  BP: 104/55 (27 Dec 2022 09:00) (53/43 - 139/81)  BP(mean): 64 (27 Dec 2022 09:00) (46 - 98)  RR: 20 (27 Dec 2022 09:00) (19 - 35)  SpO2: 98% (27 Dec 2022 09:00) (94% - 100%)    Parameters below as of 27 Dec 2022 09:00  Patient On (Oxygen Delivery Method): ventilator    O2 Concentration (%): 40  Daily     Daily Weight in k.3 (27 Dec 2022 06:00)    PPSV2: 10  %  FAST:    General:  Mental Status:  HEENT:  Lungs:  Cardiac:  GI:  :  Ext:  Neuro:      LABS:                        9.8    34.30 )-----------( 213      ( 27 Dec 2022 05:46 )             31.0         142  |  107  |  38<H>  ----------------------------<  454<HH>  4.3   |  19<L>  |  2.32<H>    Ca    7.3<L>      27 Dec 2022 05:46  Phos  5.9       Mg     1.8         TPro  4.6<L>  /  Alb  1.7<L>  /  TBili  0.4  /  DBili  x   /  AST  605<H>  /  ALT  428<H>  /  AlkPhos  111      PT/INR - ( 26 Dec 2022 12:23 )   PT: 20.2 sec;   INR: 1.73 ratio         PTT - ( 27 Dec 2022 02:10 )  PTT:136.7 sec  Albumin: Albumin, Serum: 1.7 g/dL ( @ 05:46)      Allergies    No Known Allergies    Intolerances      MEDICATIONS  (STANDING):  aspirin 325 milliGRAM(s) Oral daily  atorvastatin 20 milliGRAM(s) Oral at bedtime  chlorhexidine 0.12% Liquid 15 milliLiter(s) Oral Mucosa every 12 hours  dextrose 5%. 1000 milliLiter(s) (50 mL/Hr) IV Continuous <Continuous>  dextrose 5%. 1000 milliLiter(s) (100 mL/Hr) IV Continuous <Continuous>  dextrose 50% Injectable 25 Gram(s) IV Push once  dextrose 50% Injectable 12.5 Gram(s) IV Push once  dextrose 50% Injectable 25 Gram(s) IV Push once  EPINEPHrine    Infusion 0.8 MICROgram(s)/kG/Min (240 mL/Hr) IV Continuous <Continuous>  glucagon  Injectable 1 milliGRAM(s) IntraMuscular once  heparin  Infusion.  Unit(s)/Hr (13 mL/Hr) IV Continuous <Continuous>  insulin glargine Injectable (LANTUS) 15 Unit(s) SubCutaneous every morning  insulin lispro (ADMELOG) corrective regimen sliding scale   SubCutaneous every 6 hours  norepinephrine Infusion 0.05 MICROgram(s)/kG/Min (7.5 mL/Hr) IV Continuous <Continuous>  pantoprazole  Injectable 40 milliGRAM(s) IV Push daily  sodium bicarbonate  Infusion 0.234 mEq/kG/Hr (125 mL/Hr) IV Continuous <Continuous>  sodium chloride 0.9%. 1000 milliLiter(s) (1000 mL/Hr) IV Continuous <Continuous>  vasopressin Infusion 0.04 Unit(s)/Min (6 mL/Hr) IV Continuous <Continuous>    MEDICATIONS  (PRN):  dextrose Oral Gel 15 Gram(s) Oral once PRN Blood Glucose LESS THAN 70 milliGRAM(s)/deciliter  heparin   Injectable 6000 Unit(s) IV Push every 6 hours PRN For aPTT less than 40  heparin   Injectable 3000 Unit(s) IV Push every 6 hours PRN For aPTT between 40 - 57      RADIOLOGY/ADDITIONAL STUDIES:   HPI:       Pt  is a 82y old Male with hx  CAD sp bipass 6 weeks ago, Afib, DM II, BrCA .   He's had bpass surgery 6 weeks ago at [facility], he had been sob since than  day of admission he had seizurelike motions in front of wife, who called EMS  pateint ahd cpr for 45 minutes, had pulse on arrival to ED  - PH< 6.8  Pupils non reactive          pt intubated not responsive  vitals remain tenuous frequent titration of pressors necessary  family bedside long discussion regarding goc   DNR ONLY today, wean/comfort tomorrow morning       PAIN: ( )Yes   ( )No  Pt unable to characterise d/t clinical status     DYSPNEA: ( ) Yes  ( ) No  Level: vented    PAST MEDICAL & SURGICAL HISTORY:  Gout      Diabetes      HLD (hyperlipidemia)      Breast cancer in male  Right breast (dx )      Atrial fibrillation  1 episode in       H/O colonoscopy  between 5 to 10 yrs ago, ( to ). Was negative      History of mastectomy, bilateral  2018      S/P CABG x 3  6 weeks ago          SOCIAL HX:    Hx opiate tolerance ( )YES  (x )NO    Baseline ADLs  (Prior to Admission)  ( ) Independent   (x )Dependent    FAMILY HISTORY:      Review of Systems:     Unable to obtain/Limited due to: AMS      PHYSICAL EXAM:    Vital Signs Last 24 Hrs  T(C): 38.2 (27 Dec 2022 08:20), Max: 38.2 (27 Dec 2022 08:20)  T(F): 100.7 (27 Dec 2022 08:20), Max: 100.7 (27 Dec 2022 08:20)  HR: 103 (27 Dec 2022 09:00) (72 - 128)  BP: 104/55 (27 Dec 2022 09:00) (53/43 - 139/81)  BP(mean): 64 (27 Dec 2022 09:00) (46 - 98)  RR: 20 (27 Dec 2022 09:00) (19 - 35)  SpO2: 98% (27 Dec 2022 09:00) (94% - 100%)    Parameters below as of 27 Dec 2022 09:00  Patient On (Oxygen Delivery Method): ventilator    O2 Concentration (%): 40  Daily     Daily Weight in k.3 (27 Dec 2022 06:00)    PPSV2: 10  %  FAST:    General:nad   Mental Status: not responsive  HEENT: dilated not reactive bl pupils  Lungs: ctabl b/l bs  Cardiac: s1s2  tachy  GI: soft nontender +BS  Ext:no edema  Neuro: unresponsive      LABS:                        9.8    34.30 )-----------( 213      ( 27 Dec 2022 05:46 )             31.0         142  |  107  |  38<H>  ----------------------------<  454<HH>  4.3   |  19<L>  |  2.32<H>    Ca    7.3<L>      27 Dec 2022 05:46  Phos  5.9       Mg     1.8         TPro  4.6<L>  /  Alb  1.7<L>  /  TBili  0.4  /  DBili  x   /  AST  605<H>  /  ALT  428<H>  /  AlkPhos  111      PT/INR - ( 26 Dec 2022 12:23 )   PT: 20.2 sec;   INR: 1.73 ratio         PTT - ( 27 Dec 2022 02:10 )  PTT:136.7 sec  Albumin: Albumin, Serum: 1.7 g/dL ( @ 05:46)      Allergies    No Known Allergies    Intolerances      MEDICATIONS  (STANDING):  aspirin 325 milliGRAM(s) Oral daily  atorvastatin 20 milliGRAM(s) Oral at bedtime  chlorhexidine 0.12% Liquid 15 milliLiter(s) Oral Mucosa every 12 hours  dextrose 5%. 1000 milliLiter(s) (50 mL/Hr) IV Continuous <Continuous>  dextrose 5%. 1000 milliLiter(s) (100 mL/Hr) IV Continuous <Continuous>  dextrose 50% Injectable 25 Gram(s) IV Push once  dextrose 50% Injectable 12.5 Gram(s) IV Push once  dextrose 50% Injectable 25 Gram(s) IV Push once  EPINEPHrine    Infusion 0.8 MICROgram(s)/kG/Min (240 mL/Hr) IV Continuous <Continuous>  glucagon  Injectable 1 milliGRAM(s) IntraMuscular once  heparin  Infusion.  Unit(s)/Hr (13 mL/Hr) IV Continuous <Continuous>  insulin glargine Injectable (LANTUS) 15 Unit(s) SubCutaneous every morning  insulin lispro (ADMELOG) corrective regimen sliding scale   SubCutaneous every 6 hours  norepinephrine Infusion 0.05 MICROgram(s)/kG/Min (7.5 mL/Hr) IV Continuous <Continuous>  pantoprazole  Injectable 40 milliGRAM(s) IV Push daily  sodium bicarbonate  Infusion 0.234 mEq/kG/Hr (125 mL/Hr) IV Continuous <Continuous>  sodium chloride 0.9%. 1000 milliLiter(s) (1000 mL/Hr) IV Continuous <Continuous>  vasopressin Infusion 0.04 Unit(s)/Min (6 mL/Hr) IV Continuous <Continuous>    MEDICATIONS  (PRN):  dextrose Oral Gel 15 Gram(s) Oral once PRN Blood Glucose LESS THAN 70 milliGRAM(s)/deciliter  heparin   Injectable 6000 Unit(s) IV Push every 6 hours PRN For aPTT less than 40  heparin   Injectable 3000 Unit(s) IV Push every 6 hours PRN For aPTT between 40 - 57      RADIOLOGY/ADDITIONAL STUDIES:

## 2022-12-27 NOTE — DIETITIAN INITIAL EVALUATION ADULT - PERTINENT LABORATORY DATA
12-27    142  |  107  |  38<H>  ----------------------------<  454<HH>  4.3   |  19<L>  |  2.32<H>    Ca    7.3<L>      27 Dec 2022 05:46  Phos  5.9     12-27  Mg     1.8     12-27    TPro  4.6<L>  /  Alb  1.7<L>  /  TBili  0.4  /  DBili  x   /  AST  605<H>  /  ALT  428<H>  /  AlkPhos  111  12-27  POCT Blood Glucose.: 308 mg/dL (12-27-22 @ 11:45)  A1C with Estimated Average Glucose Result: 7.7 % (12-27-22 @ 05:46)

## 2022-12-27 NOTE — PROVIDER CONTACT NOTE (EICU) - SITUATION
Provider Location: [facility].   Patient Location:  [facility]

## 2022-12-27 NOTE — CONSULT NOTE ADULT - ASSESSMENT
Asystole / bradycardic Cardiac arrest.   Prolonged resuscitative effort. Possible cerebral anoxia  Lactic acidosis.   CAD, recent CABG  AF with RVR  HTN  DM  Renal insufficiency probably CKD or MARCELINA on CKD   ? LLL PNA    Suggest:    Medical stabilization and ICU care   Given possible anoxic cerebral injury will consider conservative cardiac management at this time.   Anticoagulation for AF if no invasive treatment and no contraindication.   Discussed with wife at length on the phone.     
Process of Care  --Reviewed dx/treatment problems and alignment with Goals of Care    Physical Aspects of Care  --Pain    c/w current managment  no signs of discomfort    --Bowel Regimen  risk for constipation d/t immobility  daily dulcolax    --Dyspnea  intubated    --Nausea Vomiting  none noted    --Weakness  PT as tolerated     Psychological and Psychiatric Aspects of Care:   --Greif/Bereavment: emotional support provided  --Hx of psychiatric dx: none  -Pastoral Care Available PRN     Social Aspects of Care  -SW involved     Cultural Aspects  -Primary Language: English    Goals of Care:     We discussed Palliative Care team being a supportive team when a patient has ongoing illnesses.  We also discussed that it is not an end of life care service, but can help navigate symptoms and emotional support througout their hospital stay here.    Hospice was explained as well  as an end of life care philosophy.  When a disease cannot be cured, or family/patient decide the treatment burdens out weigh the risk and one choses to change focus of treatment from cure to quality/comfort.       Prognosis: Death can occur at anytime, but if disease continues to progress naturally patient likely has days to weeks.    Ethical and Legal Aspects:   NA        Capacity: w/o capacity  HCP/Surrogate: wife  Code Status: DNR ONLY  MOLST: DNR ONLY  Dispo Plan: plan to wean in AM for comfort    Discussed With: Case coordinated with attending and SW and RN     Time Spent: 90 minutes including the care, coordination and counseling of this patient, 50% of which was spent coordinating and counseling.

## 2022-12-27 NOTE — DIETITIAN INITIAL EVALUATION ADULT - PERTINENT MEDS FT
MEDICATIONS  (STANDING):  aspirin 325 milliGRAM(s) Oral daily  atorvastatin 20 milliGRAM(s) Oral at bedtime  chlorhexidine 0.12% Liquid 15 milliLiter(s) Oral Mucosa every 12 hours  dextrose 5%. 1000 milliLiter(s) (50 mL/Hr) IV Continuous <Continuous>  dextrose 5%. 1000 milliLiter(s) (100 mL/Hr) IV Continuous <Continuous>  dextrose 50% Injectable 25 Gram(s) IV Push once  dextrose 50% Injectable 12.5 Gram(s) IV Push once  dextrose 50% Injectable 25 Gram(s) IV Push once  glucagon  Injectable 1 milliGRAM(s) IntraMuscular once  heparin  Infusion.  Unit(s)/Hr (13 mL/Hr) IV Continuous <Continuous>  insulin glargine Injectable (LANTUS) 15 Unit(s) SubCutaneous every morning  insulin lispro (ADMELOG) corrective regimen sliding scale   SubCutaneous every 6 hours  norepinephrine Infusion 0.3 MICROgram(s)/kG/Min (21.1 mL/Hr) IV Continuous <Continuous>  pantoprazole  Injectable 40 milliGRAM(s) IV Push daily  phenylephrine    Infusion 0.1 MICROgram(s)/kG/Min (1.4 mL/Hr) IV Continuous <Continuous>  sodium bicarbonate  Infusion 0.234 mEq/kG/Hr (125 mL/Hr) IV Continuous <Continuous>  sodium chloride 0.9%. 1000 milliLiter(s) (1000 mL/Hr) IV Continuous <Continuous>  vasopressin Infusion 0.04 Unit(s)/Min (6 mL/Hr) IV Continuous <Continuous>    MEDICATIONS  (PRN):  dextrose Oral Gel 15 Gram(s) Oral once PRN Blood Glucose LESS THAN 70 milliGRAM(s)/deciliter  heparin   Injectable 6000 Unit(s) IV Push every 6 hours PRN For aPTT less than 40  heparin   Injectable 3000 Unit(s) IV Push every 6 hours PRN For aPTT between 40 - 57

## 2022-12-27 NOTE — PROVIDER CONTACT NOTE (CRITICAL VALUE NOTIFICATION) - NAME OF MD/NP/PA/DO NOTIFIED:
Nagel
rodas
Dr. Fernando Marcus
DENY Etienne
dr arreguin
DENY Etienne
DENY Etienne
Dr Calabrese
DENY Etienne

## 2022-12-27 NOTE — CONSULT NOTE ADULT - CONSULT REASON
complex goals of care and symptom management2/2 anoxic brain injury
Cardiology Consult - cardiac arrest

## 2022-12-27 NOTE — PROGRESS NOTE ADULT - NUTRITIONAL ASSESSMENT
This patient has been assessed with a concern for Malnutrition and has been determined to have a diagnosis/diagnoses of Severe protein-calorie malnutrition.      The following pending diet order is being considered for treatment of Severe protein-calorie malnutrition:  Diet NPO with Tube Feed-  Tube Feeding Modality: Orogastric  Glucerna 1.5 Henrique (GLUCERNA1.5)  Total Volume for 24 Hours (mL): 1560  Continuous  Starting Tube Feed Rate {mL per Hour}: 20  Increase Tube Feed Rate by (mL): 10     Every 8 hours  Until Goal Tube Feed Rate (mL per Hour): 65  Tube Feed Duration (in Hours): 24  Tube Feed Start Time: 00:00  Free Water Flush  Free Water Flush Instructions:  free water flush of 40 cc/hr (provides an additional 800 mL daily); adjust prn to maintain hydration as per MD  No Carb Prosource TF     Qty per Day:  2  Entered: Dec 27 2022  2:41PM

## 2022-12-27 NOTE — PROVIDER CONTACT NOTE (CRITICAL VALUE NOTIFICATION) - PERSON GIVING RESULT:
dale Han
dale miller
lab
lab teresa clancy
Marcia   Lab
dale miller
dale miller
lab, Damon
lab-pergolis

## 2022-12-28 NOTE — PROGRESS NOTE ADULT - CONVERSATION DETAILS
Long discussion with family at this stage, we are recommending comfort measures as patient continues to decline regardless of maximal support.  We discussed prolonging suffering rather than prolonging life.     We discussed what that would mean,  no longer doing blood tests, dx imaging, abx, and palliative extubation to RA with comfort medications to address any symptoms that may arise. This would mean shortened life span but would focus on comfort and quality at the end of life.       Family wants to proceed w/ comfort only  pre medication w/ Dilaudid prior to extubation recommended

## 2022-12-28 NOTE — PROGRESS NOTE ADULT - ASSESSMENT
Assessment:  Mr. Crawford is a 81 YO M w/pmhx of CAD s/p CABG 6 weeks ago, DM who presented to ED s/p OOH cardiac arrest w/prolonged downtime, around 45 minutes. Patient found to have b/l PEs and LE DVT. Patient is in shock state and MSOF    Problem List:  1. Cardiac arrest possibly 2/2 PEs  2. Shock, obstructive (pericardial effusion on POCUS w/o diastolic collapse, and RV appears enlarged possibly from PE) vs cardiogenic   3. MARCELINA  4. Metabolic acidosis   5. Shock liver  6. Acute hypoxic respiratory failure   7. PE w/cor pulmonale   8. LE DVT    Plan:  Neuro: Mental status extremely poor especially seeing the degree of cerebral edema that early on CT head. Consider MRI & EEG. No sedation    CV: Obstructive vs cardiogenic shock. Actively titrating Levophed to maintain MAP>65mmHg. On fixed dose vasopressin. TTE results pending. Monitor HR and BP  Pulm: Patient currently on Full vent support. Titrate settings to maintain SaO2 >90%, or pH >7.25. Consider low tidal volume ventilation strategy w/ goal Tv 4-6 cc/kg of ideal body weight. Plateau pressure goal <30. Peridex oral care. Aggressive pulmonary toilet. Daily sedation vacation with spontaneous breathing trial if clinical condition warrants, discuss with respiratory therapy  Renal: MARCELINA, metabolic acidosis, and oliguria. Not stable enough to tolerate HD at this moment, c/w bicarb gtt for acidosis. Strict I/Os, goal UOP >0.5cc/kg/hr. Trend renal function and electrolytes, replete as needed. Avoid nephrotoxic agents  GI: NPO PPI   ID: No acute infectious process   Heme: AC started w/heparin gtt, no bleeding on head CT   Endo: Goal blood glucose <180. AM TSH, Lipids, and A1c. C/w ISS    CRITICAL CARE TIME SPENT: 50 minutes assessing presenting problems of acute illness, which pose high probability of life threatening deterioration or end organ damage/dysfunction, as well as medical decision making including initiating plan of care, reviewing data, reviewing radiologic exams, discussing with multidisciplinary team,  discussing goals of care with patient/family, and writing this note.  Non-inclusive of procedures performed  
Assessment:  Mr. Crawford is a 81 YO M w/pmhx of CAD s/p CABG 6 weeks ago, DM who presented to ED s/p OOH cardiac arrest w/prolonged downtime, around 45 minutes. Patient found to have b/l PEs and LE DVT. Patient is in shock state and MSOF    Problem List:  1. Cardiac arrest possibly 2/2 PEs  2. Shock, obstructive (pericardial effusion on POCUS w/o diastolic collapse, and RV appears enlarged possibly from PE) vs cardiogenic   3. MARCELINA  4. Metabolic acidosis   5. Shock liver  6. Acute hypoxic respiratory failure   7. PE w/cor pulmonale   8. LE DVT    Plan:  Neuro: Mental status extremely poor especially seeing the degree of cerebral edema that early on CT head. Consider MRI & EEG. No sedation    CV: Obstructive vs cardiogenic shock. Actively titrating Levophed to maintain MAP>65mmHg. On fixed dose vasopressin. TTE results pending. Monitor HR and BP  Pulm: Patient currently on Full vent support. Titrate settings to maintain SaO2 >90%, or pH >7.25. Consider low tidal volume ventilation strategy w/ goal Tv 4-6 cc/kg of ideal body weight. Plateau pressure goal <30. Peridex oral care. Aggressive pulmonary toilet. Daily sedation vacation with spontaneous breathing trial if clinical condition warrants, discuss with respiratory therapy  Renal: MARCELINA, metabolic acidosis, and oliguria. Not stable enough to tolerate HD at this moment, c/w bicarb gtt for acidosis. Strict I/Os, goal UOP >0.5cc/kg/hr. Trend renal function and electrolytes, replete as needed. Avoid nephrotoxic agents  GI: NPO PPI   ID: No acute infectious process   Heme: AC w/heparin gtt, no bleeding on head CT   Endo: Goal blood glucose <180. AM TSH, Lipids, and A1c. C/w ISS    CRITICAL CARE TIME SPENT: 35minutes assessing presenting problems of acute illness, which pose high probability of life threatening deterioration or end organ damage/dysfunction, as well as medical decision making including initiating plan of care, reviewing data, reviewing radiologic exams, discussing with multidisciplinary team,  discussing goals of care with patient/family, and writing this note.  Non-inclusive of procedures performed    
Asystole / bradycardic Cardiac arrest.   Prolonged resuscitative effort. Possible cerebral anoxia  Lactic acidosis.   CAD, recent CABG  AF VR acceptable n ow  HTN  DM  Renal insufficiency probably CKD or MARCELINA on CKD   ? LLL PNA    Suggest:    cont supportive care, wean pressors if tolerated.  Given possible anoxic cerebral injury will consider conservative cardiac management at this time.   Anticoagulation for AF if no invasive treatment and no contraindication.     
Asystole / bradycardic Cardiac arrest. shock  Prolonged resuscitative effort. Possible cerebral anoxia  Lactic acidosis.   CAD, recent CABG  AF VR acceptable n ow  HTN  DM  Renal insufficiency probably CKD or MARCELINA on CKD   ? LLL PNA  Plan:  spoke with intensivist  family to withdraw care today
A/P: 82 male S/P out of hospital arrest abd B/L PEs    Plan:  ICU    Comfort Care not  Compassionate Liberation from the vent  Dilaudid for comfort  Glycopyrrolate   
Process of Care  --Reviewed dx/treatment problems and alignment with Goals of Care    Physical Aspects of Care  --Pain  dilaudid PRN      --Bowel Regimen  risk for constipation d/t immobility  daily dulcolax    --Dyspnea  intubated  dilaudid prior to extubation    --Nausea Vomiting  none noted    --Weakness  PT as tolerated     Psychological and Psychiatric Aspects of Care:   --Greif/Bereavment: emotional support provided  --Hx of psychiatric dx: none  -Pastoral Care Available PRN     Social Aspects of Care  -SW involved     Cultural Aspects  -Primary Language: English    Goals of Care:     We discussed Palliative Care team being a supportive team when a patient has ongoing illnesses.  We also discussed that it is not an end of life care service, but can help navigate symptoms and emotional support througout their hospital stay here.    Hospice was explained as well  as an end of life care philosophy.  When a disease cannot be cured, or family/patient decide the treatment burdens out weigh the risk and one choses to change focus of treatment from cure to quality/comfort.       Prognosis: Death can occur at anytime, but if disease continues to progress naturally patient likely has minutes to hours    Ethical and Legal Aspects:   NA        Capacity: w/o capacity  HCP/Surrogate: wife  Code Status: COMFORT   MOLST: COMFORT  Dispo Plan: wean this AM    Discussed With: Case coordinated with attending and SW and RN     Time Spent: 45 minutes including the care, coordination and counseling of this patient, 50% of which was spent coordinating and counseling. 
A/P: 82 male S/P out of hospital arrest abd B/L PEs    Plan:  ICU      PULM: Continue UFH for PEs.  No vent weaning    Cardio: ASA/Statin.  Continue levo/Vaso for dissociative shock    Renal: Continue Bicarb drip.  Danial likely from ATN    GI: PPI.  Tube Feeds    DVT prophylaxis with UFH    Palliative care consult

## 2022-12-28 NOTE — PROGRESS NOTE ADULT - NEUROLOGICAL COMMENTS
LV 4/12/22 WITH DR LUCY SAWYER NONE   Return in about 2 weeks (around 4/26/2022), or if symptoms worsen or fail to improve.
No pupils, corneal, dolls, gag.  he is breathing above the vent
only breathing above the vent

## 2022-12-28 NOTE — PROGRESS NOTE ADULT - RESPIRATORY
clear to auscultation bilaterally/no wheezes/no rales/no rhonchi
clear to auscultation bilaterally/no wheezes/no rales/no rhonchi

## 2022-12-28 NOTE — PROGRESS NOTE ADULT - REASON FOR ADMISSION
Cardiac arrest

## 2022-12-28 NOTE — PROGRESS NOTE ADULT - SUBJECTIVE AND OBJECTIVE BOX
CC:    HPI:  This patient is an 82 year old Male     PMH sign For CAD     Afib in past    Type II diabetes     brest cancer     had bpass surgery 6 weeks ago at [facility], he had been sob since than  Today he had seizurelike motions in front of wife, who called EMS  pateint ahd cpr for 45 minutes, had pulse on arrival to ED  after arrest pateint on Epinephrine drip at 1 mcg/kg/min  PH< 6.8  Pupils non reactive     : Patient seen in bed vented, unresponsive, only breathing above the vent       PAST MEDICAL & SURGICAL HISTORY:  Gout      Diabetes      HLD (hyperlipidemia)      Breast cancer in male  Right breast (dx )      Atrial fibrillation  1 episode in       H/O colonoscopy  between 5 to 10 yrs ago, ( to ). Was negative      History of mastectomy, bilateral  2018      S/P CABG x 3  6 weeks ago          FAMILY HISTORY:      Social Hx:    Allergies    No Known Allergies    Intolerances          Weight (kg): 74.9 (- @ 15:00)    ICU Vital Signs Last 24 Hrs  T(C): 38.8 (27 Dec 2022 10:43), Max: 38.8 (27 Dec 2022 10:43)  T(F): 101.8 (27 Dec 2022 10:43), Max: 101.8 (27 Dec 2022 10:43)  HR: 143 (27 Dec 2022 11:55) (72 - 145)  BP: 90/63 (27 Dec 2022 11:55) (53/43 - 139/81)  BP(mean): 70 (27 Dec 2022 11:55) (46 - 98)  ABP: 77/51 (27 Dec 2022 11:55) (75/46 - 121/73)  ABP(mean): 59 (27 Dec 2022 11:55) (-5 - 92)  RR: 26 (27 Dec 2022 11:55) (19 - 35)  SpO2: 98% (27 Dec 2022 11:55) (94% - 100%)    O2 Parameters below as of 27 Dec 2022 09:00  Patient On (Oxygen Delivery Method): ventilator    O2 Concentration (%): 40        Mode: AC/ CMV (Assist Control/ Continuous Mandatory Ventilation)  RR (machine): 26  TV (machine): 450  FiO2: 40  PEEP: 8  ITime: 0.8  MAP: 9  PIP: 21      I&O's Summary    26 Dec 2022 07:01  -  27 Dec 2022 07:00  --------------------------------------------------------  IN: 4417 mL / OUT: 675 mL / NET: 3742 mL    27 Dec 2022 07:01  -  27 Dec 2022 12:05  --------------------------------------------------------  IN: 100 mL / OUT: 180 mL / NET: -80 mL                              9.8    34.30 )-----------( 213      ( 27 Dec 2022 05:46 )             31.0           142  |  107  |  38<H>  ----------------------------<  454<HH>  4.3   |  19<L>  |  2.32<H>    Ca    7.3<L>      27 Dec 2022 05:46  Phos  5.9       Mg     1.8         TPro  4.6<L>  /  Alb  1.7<L>  /  TBili  0.4  /  DBili  x   /  AST  605<H>  /  ALT  428<H>  /  AlkPhos  111              ABG - ( 27 Dec 2022 06:10 )  pH, Arterial: 7.30  pH, Blood: x     /  pCO2: 36    /  pO2: 112   / HCO3: 18    / Base Excess: -7.9  /  SaO2: 99                  Urinalysis Basic - ( 26 Dec 2022 13:15 )    Color: Yellow / Appearance: very cloudy / S.015 / pH: x  Gluc: x / Ketone: Negative  / Bili: Negative / Urobili: Negative   Blood: x / Protein: 100 / Nitrite: Negative   Leuk Esterase: Negative / RBC: 11-25 /HPF / WBC 0-2 /HPF   Sq Epi: x / Non Sq Epi: Few / Bacteria: Moderate        MEDICATIONS  (STANDING):  aspirin 325 milliGRAM(s) Oral daily  atorvastatin 20 milliGRAM(s) Oral at bedtime  chlorhexidine 0.12% Liquid 15 milliLiter(s) Oral Mucosa every 12 hours  dextrose 5%. 1000 milliLiter(s) (50 mL/Hr) IV Continuous <Continuous>  dextrose 5%. 1000 milliLiter(s) (100 mL/Hr) IV Continuous <Continuous>  dextrose 50% Injectable 25 Gram(s) IV Push once  dextrose 50% Injectable 12.5 Gram(s) IV Push once  dextrose 50% Injectable 25 Gram(s) IV Push once  EPINEPHrine    Infusion 0.8 MICROgram(s)/kG/Min (240 mL/Hr) IV Continuous <Continuous>  glucagon  Injectable 1 milliGRAM(s) IntraMuscular once  heparin  Infusion.  Unit(s)/Hr (13 mL/Hr) IV Continuous <Continuous>  insulin glargine Injectable (LANTUS) 15 Unit(s) SubCutaneous every morning  insulin lispro (ADMELOG) corrective regimen sliding scale   SubCutaneous every 6 hours  norepinephrine Infusion 0.05 MICROgram(s)/kG/Min (7.5 mL/Hr) IV Continuous <Continuous>  pantoprazole  Injectable 40 milliGRAM(s) IV Push daily  sodium bicarbonate  Infusion 0.234 mEq/kG/Hr (125 mL/Hr) IV Continuous <Continuous>  sodium chloride 0.9%. 1000 milliLiter(s) (1000 mL/Hr) IV Continuous <Continuous>  vasopressin Infusion 0.04 Unit(s)/Min (6 mL/Hr) IV Continuous <Continuous>    MEDICATIONS  (PRN):  dextrose Oral Gel 15 Gram(s) Oral once PRN Blood Glucose LESS THAN 70 milliGRAM(s)/deciliter  heparin   Injectable 6000 Unit(s) IV Push every 6 hours PRN For aPTT less than 40  heparin   Injectable 3000 Unit(s) IV Push every 6 hours PRN For aPTT between 40 - 57      DVT Prophylaxis:    Advanced Directives:  Discussed with:    Visit Information: 30 min    ** Time is exclusive of billed procedures and/or teaching and/or routine family updates.        
Patient is a 82y old  Male who presents with a chief complaint of Cardiac arrest     (27 Dec 2022 14:18)    BRIEF HOSPITAL COURSE:   Mr. Crawford is a 81 YO M w/pmhx of CAD s/p CABG 6 weeks ago, DM who presented to ED s/p OOH cardiac arrest w/prolonged downtime, around 45 minutes. Patient found to have b/l PEs and LE DVT. Patient is in shock state and MSOF    Events last 24 hours:   - Patient intubated NOT sedated  - Requiring three vasopressors for MAP support  - CT head w/cerebral edema, c/w anoxic brain injury   - Bicarb gtt for severe metabolic acidosis   - Neurologic status extremely poor, pupils fixed & dilated, nonreactive. No cough or gag for me, but is overbreathing ventilator     Review of Systems:  Unable to assess given clinical condition     PAST MEDICAL & SURGICAL HISTORY:  Gout  Diabetes  HLD (hyperlipidemia)  Breast cancer in male  Right breast (dx )  Atrial fibrillation  1 episode in   H/O colonoscopy  between 5 to 10 yrs ago, ( to ). Was negative  History of mastectomy, bilateral  2018  S/P CABG x 3  6 weeks ago    Medications:  norepinephrine Infusion 0.3 MICROgram(s)/kG/Min IV Continuous <Continuous>  phenylephrine    Infusion 0.1 MICROgram(s)/kG/Min IV Continuous <Continuous>  aspirin 325 milliGRAM(s) Oral daily  heparin   Injectable 6000 Unit(s) IV Push every 6 hours PRN  heparin   Injectable 3000 Unit(s) IV Push every 6 hours PRN  heparin  Infusion.  Unit(s)/Hr IV Continuous <Continuous>  pantoprazole  Injectable 40 milliGRAM(s) IV Push daily  atorvastatin 20 milliGRAM(s) Oral at bedtime  dextrose 50% Injectable 25 Gram(s) IV Push once  dextrose 50% Injectable 12.5 Gram(s) IV Push once  dextrose 50% Injectable 25 Gram(s) IV Push once  dextrose Oral Gel 15 Gram(s) Oral once PRN  glucagon  Injectable 1 milliGRAM(s) IntraMuscular once  insulin glargine Injectable (LANTUS) 15 Unit(s) SubCutaneous every morning  insulin lispro (ADMELOG) corrective regimen sliding scale   SubCutaneous every 6 hours  vasopressin Infusion 0.04 Unit(s)/Min IV Continuous <Continuous>  dextrose 5%. 1000 milliLiter(s) IV Continuous <Continuous>  dextrose 5%. 1000 milliLiter(s) IV Continuous <Continuous>  sodium bicarbonate  Infusion 0.234 mEq/kG/Hr IV Continuous <Continuous>  sodium chloride 0.9%. 1000 milliLiter(s) IV Continuous <Continuous>  chlorhexidine 0.12% Liquid 15 milliLiter(s) Oral Mucosa every 12 hours    Mode: AC/ CMV (Assist Control/ Continuous Mandatory Ventilation)  RR (machine): 26  TV (machine): 450  FiO2: 40  PEEP: 8  ITime: 0.8  MAP: 12  PIP: 23    ICU Vital Signs Last 24 Hrs  T(C): 37.7 (27 Dec 2022 20:00), Max: 38.8 (27 Dec 2022 10:43)  T(F): 99.8 (27 Dec 2022 20:00), Max: 101.8 (27 Dec 2022 10:43)  HR: 122 (27 Dec 2022 21:00) (96 - 151)  BP: 115/86 (27 Dec 2022 21:00) (61/41 - 128/77)  BP(mean): 92 (27 Dec 2022 21:00) (46 - 93)  ABP: 114/77 (27 Dec 2022 21:00) (55/37 - 121/73)  ABP(mean): 90 (27 Dec 2022 21:00) (41 - 92)  RR: 26 (27 Dec 2022 21:00) (19 - 33)  SpO2: 99% (27 Dec 2022 21:00) (95% - 100%)  O2 Parameters below as of 27 Dec 2022 20:00  Patient On (Oxygen Delivery Method): ventilator  O2 Concentration (%): 40    ABG - ( 27 Dec 2022 06:10 )  pH, Arterial: 7.30  pH, Blood: x     /  pCO2: 36    /  pO2: 112   / HCO3: 18    / Base Excess: -7.9  /  SaO2: 99        I&O's Detail    26 Dec 2022 07:01  -  27 Dec 2022 07:00  --------------------------------------------------------  IN:    Heparin Infusion: 99 mL    Norepinephrine: 687 mL    Sodium Bicarbonate: 1579 mL    sodium chloride 0.9%: 1000 mL    Sodium Chloride 0.9% Bolus: 1000 mL    Vasopressin: 52 mL  Total IN: 4417 mL    OUT:    Indwelling Catheter - Urethral (mL): 675 mL  Total OUT: 675 mL    Total NET: 3742 mL    27 Dec 2022 07:01  -  27 Dec 2022 22:54  --------------------------------------------------------  IN:    Heparin Infusion: 95.5 mL    IV PiggyBack: 100 mL    Norepinephrine: 567.8 mL    Sodium Bicarbonate: 1289 mL    Vasopressin: 62.1 mL  Total IN: 2114.4 mL    OUT:    Indwelling Catheter - Urethral (mL): 280 mL  Total OUT: 280 mL    Total NET: 1834.4 mL    LABS:                        9.8    34.30 )-----------( 213      ( 27 Dec 2022 05:46 )             31.0         142  |  107  |  38<H>  ----------------------------<  454<HH>  4.3   |  19<L>  |  2.32<H>    Ca    7.3<L>      27 Dec 2022 05:46  Phos  5.9       Mg     1.8         TPro  4.6<L>  /  Alb  1.7<L>  /  TBili  0.4  /  DBili  x   /  AST  605<H>  /  ALT  428<H>  /  AlkPhos  111      POCT Blood Glucose.: 308 mg/dL (27 Dec 2022 11:45)    PT/INR - ( 26 Dec 2022 12:23 )   PT: 20.2 sec;   INR: 1.73 ratio    PTT - ( 27 Dec 2022 18:00 )  PTT:119.5 sec    Urinalysis Basic - ( 26 Dec 2022 13:15 )  Color: Yellow / Appearance: very cloudy / S.015 / pH: x  Gluc: x / Ketone: Negative  / Bili: Negative / Urobili: Negative   Blood: x / Protein: 100 / Nitrite: Negative   Leuk Esterase: Negative / RBC: 11-25 /HPF / WBC 0-2 /HPF   Sq Epi: x / Non Sq Epi: Few / Bacteria: Moderate    CULTURES:  Rapid RVP Result: NotDetec (12-26 @ 12:23)    Physical Examination:  General: No acute distress.    HEENT: Fixed and dilated, nonreactive   PULM: Clear to auscultation bilaterally, no significant sputum production  NECK: Supple, no lymphadenopathy, trachea midline  CVS: Regular rate and rhythm, no murmurs, rubs, or gallops  ABD: Soft, nondistended, nontender, normoactive bowel sounds, no masses  EXT: No edema, nontender  SKIN: Cool and poorly perfused   NEURO: Does not withdraw to pain, overbreathing ventilator    RADIOLOGY:   < from: CT Head No Cont (22 @ 18:48) >    ACC: [id number] EXAM:  CT BRAIN                          PROCEDURE DATE:  2022      INTERPRETATION:  CLINICAL INDICATIONS:  Anoxic brain damage    COMPARISON: CT dated 2014.    TECHNIQUE: Noncontrast CT of the head. Multiplanar reformations are   submitted.    FINDINGS:  Persistent intravenous contrast from a prior contrast-enhanced study is   demonstrated. Diffuse sulcal effacement as compared to the prior head CT   dated 2014 suggesting diffuse cerebral edema.  There is periventricular and subcortical white matter hypodensity without   mass effect, nonspecific, likely representing mild chronic microvascular   ischemic changes. Left-sided approach NG tube The orbits, mastoid air   cells and visualized paranasal sinuses are unremarkable. The calvarium is   intact. Consider MRI as clinically warranted.    IMPRESSION: Diffuse sulcal effacement suggesting diffuse cerebral edema   and global hypoxic injury. Consider follow-up CT or MRI for further   evaluation.    --- End of Report ---    JOAO PHAN MD; Attending Radiologist  This document has been electronically signed. Dec 26 2022  7:37PM    < end of copied text >    < from: CT Angio Chest PE Protocol w/ IV Cont (22 @ 14:26) >    ACC: [id number] EXAM:  CT ANGIO CHEST PULM [facility]                          PROCEDURE DATE:  2022      INTERPRETATION:  CLINICAL INFORMATION: Shortness of breath. Evaluate for   pulmonary embolism.    COMPARISON: 3/12/2022.    CONTRAST/COMPLICATIONS:  IV Contrast: Omnipaque 350  90 cc administered   10 cc discarded  Oral Contrast: NONE  Complications: None reported at time of study completion    PROCEDURE:  CT Angiography of the Chest.  Sagittal and coronal reformats were performed aswell as 3D (MIP)   reconstructions.    FINDINGS: Bilateral pulmonary emboli identified with CT evidence for   right heart strain.    LUNGS AND AIRWAYS: Patent central airways.  Airspace consolidation is   identified within the left lower lobe. Additional regions of airspace   opacity are identified within the right upper lobe, right lower lobe and   lingular segment left upper lobe. Mosaic perfusion is identified   bilaterally.  PLEURA: No evidence for pleural effusion or pneumothorax.  MEDIASTINUM AND MARYAM: Mediastinal surgical clips identified. No   mediastinal or hilar lymphadenopathy identified.  VESSELS: Thoracic aortic caliber unremarkable.  HEART: Heart size appears within normal limits. No pericardial effusion   is identified.  CHEST WALL AND LOWER NECK: Note made of an indwelling endotracheal tube.  VISUALIZED UPPER ABDOMEN: Bilateral adrenal glands appear unremarkable.   Left renal parenchymal atrophy.  BONES: Status post sternotomy. Degenerative changes.    IMPRESSION: Bilateral pulmonary emboli with CT evidence for right heart   strain. Airspace consolidation is identified within the left lower lobe.   Additional regions of airspace opacity are identified within the right   upper lobe, right lower lobe and lingular segmentleft upper lobe. Mosaic   perfusion is identified bilaterally.    --- End of Report ---    MEHRDAD RODRIGUEZ MD; Attending Radiologist  This document has been electronically signed. Dec 26 2022  2:50PM    < end of copied text >  
Patient is a 82y old  Male who presents with a chief complaint of Cardiac arrest (27 Dec 2022 22:54)      HPI:  This patient is an 82 year old Male     PMH sign For CAD     Afib in past    Type II diabetes     brest cancer 2020    had bpass surgery 6 weeks ago at [facility], he had been sob since than  Today he had seizurelike motions in front of wife, who called EMS  pateint ahd cpr for 45 minutes, had pulse on arrival to ED  after arrest pateint on Epinephrine drip at 1 mcg/kg/min  PH< 6.8  Pupils non reactive (26 Dec 2022 13:39)  Cardiology  82y old  Male who presents with a witnessed seizure and cardiac arrest. Asystole, bradycardia in field. CPR X 45 minutes and ROSC. Intubated and on pressors via IV drip. EKG shoed AF and ST depressions. ER MD called consult for possible ACS.  No c/o chest pain today before the episode. Recently with shortness of breath since CABG 6 wks ago at [facility]   cardiology. remains unresponsive, on vent   unresponsive now in afl, 2:1 conduction on pressors      PAST MEDICAL & SURGICAL HISTORY:  Gout      Diabetes      HLD (hyperlipidemia)      Breast cancer in male  Right breast (dx )      Atrial fibrillation  1 episode in       H/O colonoscopy  between 5 to 10 yrs ago, ( to ). Was negative      History of mastectomy, bilateral  2018      S/P CABG x 3  6 weeks ago            MEDICATIONS  (STANDING):  aspirin 325 milliGRAM(s) Oral daily  atorvastatin 20 milliGRAM(s) Oral at bedtime  chlorhexidine 0.12% Liquid 15 milliLiter(s) Oral Mucosa every 12 hours  dextrose 5%. 1000 milliLiter(s) (50 mL/Hr) IV Continuous <Continuous>  dextrose 5%. 1000 milliLiter(s) (100 mL/Hr) IV Continuous <Continuous>  dextrose 50% Injectable 25 Gram(s) IV Push once  dextrose 50% Injectable 12.5 Gram(s) IV Push once  dextrose 50% Injectable 25 Gram(s) IV Push once  glucagon  Injectable 1 milliGRAM(s) IntraMuscular once  heparin  Infusion.  Unit(s)/Hr (13 mL/Hr) IV Continuous <Continuous>  insulin glargine Injectable (LANTUS) 15 Unit(s) SubCutaneous every morning  insulin lispro (ADMELOG) corrective regimen sliding scale   SubCutaneous every 6 hours  norepinephrine Infusion 0.3 MICROgram(s)/kG/Min (21.1 mL/Hr) IV Continuous <Continuous>  pantoprazole  Injectable 40 milliGRAM(s) IV Push daily  phenylephrine    Infusion 0.1 MICROgram(s)/kG/Min (1.4 mL/Hr) IV Continuous <Continuous>  sodium bicarbonate  Infusion 0.234 mEq/kG/Hr (125 mL/Hr) IV Continuous <Continuous>  sodium chloride 0.9%. 1000 milliLiter(s) (1000 mL/Hr) IV Continuous <Continuous>  vasopressin Infusion 0.04 Unit(s)/Min (6 mL/Hr) IV Continuous <Continuous>    MEDICATIONS  (PRN):  dextrose Oral Gel 15 Gram(s) Oral once PRN Blood Glucose LESS THAN 70 milliGRAM(s)/deciliter  heparin   Injectable 6000 Unit(s) IV Push every 6 hours PRN For aPTT less than 40  heparin   Injectable 3000 Unit(s) IV Push every 6 hours PRN For aPTT between 40 - 57          Vital Signs Last 24 Hrs  T(C): 37.6 (28 Dec 2022 04:00), Max: 38.8 (27 Dec 2022 10:43)  T(F): 99.6 (28 Dec 2022 04:00), Max: 101.8 (27 Dec 2022 10:43)  HR: 156 (28 Dec 2022 06:00) (101 - 158)  BP: 114/84 (28 Dec 2022 06:00) (61/41 - 120/90)  BP(mean): 91 (28 Dec 2022 06:00) (46 - 97)  RR: 26 (28 Dec 2022 06:00) (19 - 26)  SpO2: 99% (28 Dec 2022 06:00) (95% - 100%)    Parameters below as of 28 Dec 2022 04:00  Patient On (Oxygen Delivery Method): ventilator    O2 Concentration (%): 40    I&O's Summary    27 Dec 2022 07:01  -  28 Dec 2022 07:00  --------------------------------------------------------  IN: 6666.4 mL / OUT: 780 mL / NET: 5886.4 mL        PHYSICAL EXAM  General Appearance:  HEENT:   Neck:   Back:   Lungs:   Heart:   Abdomen:   Extremities:   Skin:   Neurologic:       INTERPRETATION OF TELEMETRY:    ECG:        LABS:                          7.7    17.36 )-----------( 138      ( 28 Dec 2022 05:51 )             24.0         141  |  103  |  46<H>  ----------------------------<  418<H>  3.9   |  27  |  3.23<H>    Ca    7.0<L>      28 Dec 2022 05:51  Phos  5.4       Mg     1.7         TPro  4.5<L>  /  Alb  1.7<L>  /  TBili  0.4  /  DBili  0.1  /  AST  615<H>  /  ALT  770<H>  /  AlkPhos  96          Lipid Panel  95  33  --  81    Pro BNP  569  @ 19:28  D Dimer  --  @ 19:28  Pro BNP  803  @ 12:23  D Dimer  --  @ 12:23    PT/INR - ( 26 Dec 2022 12:23 )   PT: 20.2 sec;   INR: 1.73 ratio         PTT - ( 28 Dec 2022 00:05 )  PTT:90.5 sec  Urinalysis Basic - ( 26 Dec 2022 13:15 )    Color: Yellow / Appearance: very cloudy / S.015 / pH: x  Gluc: x / Ketone: Negative  / Bili: Negative / Urobili: Negative   Blood: x / Protein: 100 / Nitrite: Negative   Leuk Esterase: Negative / RBC: 11-25 /HPF / WBC 0-2 /HPF   Sq Epi: x / Non Sq Epi: Few / Bacteria: Moderate      ABG - ( 27 Dec 2022 06:10 )  pH, Arterial: 7.30  pH, Blood: x     /  pCO2: 36    /  pO2: 112   / HCO3: 18    / Base Excess: -7.9  /  SaO2: 99                    RADIOLOGY & ADDITIONAL STUDIES:    
Patient is a 82y old  Male who presents with a chief complaint of Cardiac arrest (26 Dec 2022 14:22)    BRIEF HOSPITAL COURSE:   Mr. Crawford is a 83 YO M w/pmhx of CAD s/p CABG 6 weeks ago, DM who presented to ED s/p OOH cardiac arrest w/prolonged downtime, around 45 minutes. Patient found to have b/l PEs and LE DVT. Patient is in shock state and MSOF    Events last 24 hours:   - Patient intubated NOT sedated  - Requiring two vasopressors for MAP support  - CT head w/cerebral edema, c/w anoxic brain injury   - Bicarb gtt for severe metabolic acidosis   - Neurologic status extremely poor, pupils fixed & dilated, nonreactive. No cough or gag for me, but is overbreathing ventilator     Review of Systems:  Unable to assess given clinical condition     PAST MEDICAL & SURGICAL HISTORY:  Gout  Diabetes  HLD (hyperlipidemia)  Breast cancer in male  Right breast (dx )  Atrial fibrillation  1 episode in   H/O colonoscopy  between 5 to 10 yrs ago, ( to ). Was negative  History of mastectomy, bilateral  2018  S/P CABG x 3  6 weeks ago    Medications:  EPINEPHrine    Infusion 0.8 MICROgram(s)/kG/Min IV Continuous <Continuous>  norepinephrine Infusion 0.05 MICROgram(s)/kG/Min IV Continuous <Continuous>  aspirin 325 milliGRAM(s) Oral daily  heparin   Injectable 6000 Unit(s) IV Push every 6 hours PRN  heparin   Injectable 3000 Unit(s) IV Push every 6 hours PRN  heparin  Infusion.  Unit(s)/Hr IV Continuous <Continuous>  pantoprazole  Injectable 40 milliGRAM(s) IV Push daily  atorvastatin 20 milliGRAM(s) Oral at bedtime  dextrose 50% Injectable 25 Gram(s) IV Push once  dextrose 50% Injectable 12.5 Gram(s) IV Push once  dextrose 50% Injectable 25 Gram(s) IV Push once  dextrose Oral Gel 15 Gram(s) Oral once PRN  glucagon  Injectable 1 milliGRAM(s) IntraMuscular once  insulin lispro (ADMELOG) corrective regimen sliding scale   SubCutaneous every 6 hours  vasopressin Infusion 0.04 Unit(s)/Min IV Continuous <Continuous>  dextrose 5%. 1000 milliLiter(s) IV Continuous <Continuous>  dextrose 5%. 1000 milliLiter(s) IV Continuous <Continuous>  sodium bicarbonate  Infusion 0.234 mEq/kG/Hr IV Continuous <Continuous>  sodium chloride 0.9%. 1000 milliLiter(s) IV Continuous <Continuous>  chlorhexidine 0.12% Liquid 15 milliLiter(s) Oral Mucosa every 12 hours    Mode: AC/ CMV (Assist Control/ Continuous Mandatory Ventilation)  RR (machine): 26  TV (machine): 450  FiO2: 40  PEEP: 8  ITime: 0.8  MAP: 9  PIP: 20    ICU Vital Signs Last 24 Hrs  T(C): 33.8 (26 Dec 2022 19:00), Max: 36.6 (26 Dec 2022 12:20)  T(F): 92.8 (26 Dec 2022 19:00), Max: 97.9 (26 Dec 2022 12:20)  HR: 118 (26 Dec 2022 22:00) (72 - 128)  BP: 122/77 (26 Dec 2022 22:00) (53/43 - 139/81)  BP(mean): 85 (26 Dec 2022 22:00) (46 - 98)  ABP: 119/73 (26 Dec 2022 22:00) (85/57 - 119/77)  ABP(mean): 90 (26 Dec 2022 22:00) (-5 - 92)  RR: 32 (26 Dec 2022 22:00) (21 - 35)  SpO2: 95% (26 Dec 2022 22:00) (94% - 100%)  O2 Parameters below as of 26 Dec 2022 19:00  Patient On (Oxygen Delivery Method): ventilator  O2 Concentration (%): 40    ABG - ( 26 Dec 2022 20:19 )  pH, Arterial: 7.19  pH, Blood: x     /  pCO2: 33    /  pO2: 138   / HCO3: 13    / Base Excess: -14.5 /  SaO2: 100.0     I&O's Detail    26 Dec 2022 07:01  -  26 Dec 2022 23:02  --------------------------------------------------------  IN:    Norepinephrine: 250 mL    Sodium Bicarbonate: 425 mL    sodium chloride 0.9%: 1000 mL    Sodium Chloride 0.9% Bolus: 1000 mL  Total IN: 2675 mL    OUT:    Indwelling Catheter - Urethral (mL): 280 mL  Total OUT: 280 mL    Total NET: 2395 mL    LABS:                        9.3    34.23 )-----------( 202      ( 26 Dec 2022 19:28 )             30.7         145  |  113<H>  |  27<H>  ----------------------------<  426<H>  3.6   |  16<L>  |  1.85<H>    Ca    7.5<L>      26 Dec 2022 19:28  Phos  6.7       Mg     2.4         TPro  4.6<L>  /  Alb  1.8<L>  /  TBili  0.9  /  DBili  x   /  AST  658<H>  /  ALT  424<H>  /  AlkPhos  152<H>      POCT Blood Glucose.: 318 mg/dL (26 Dec 2022 19:19)    PT/INR - ( 26 Dec 2022 12:23 )   PT: 20.2 sec;   INR: 1.73 ratio    PTT - ( 26 Dec 2022 12:23 )  PTT:75.5 sec    Urinalysis Basic - ( 26 Dec 2022 13:15 )  Color: Yellow / Appearance: very cloudy / S.015 / pH: x  Gluc: x / Ketone: Negative  / Bili: Negative / Urobili: Negative   Blood: x / Protein: 100 / Nitrite: Negative   Leuk Esterase: Negative / RBC: 11-25 /HPF / WBC 0-2 /HPF   Sq Epi: x / Non Sq Epi: Few / Bacteria: Moderate    CULTURES:  Rapid RVP Result: NotDetec ( @ 12:23)    Physical Examination:  General: No acute distress.    HEENT: Fixed and dilated, nonreactive   PULM: Clear to auscultation bilaterally, no significant sputum production  NECK: Supple, no lymphadenopathy, trachea midline  CVS: Regular rate and rhythm, no murmurs, rubs, or gallops  ABD: Soft, nondistended, nontender, normoactive bowel sounds, no masses  EXT: No edema, nontender  SKIN: Cool and poorly perfused   NEURO: Does not withdraw to pain, overbreathing ventilator    RADIOLOGY:   < from: CT Head No Cont (22 @ 18:48) >    ACC: [id number] EXAM:  CT BRAIN                          PROCEDURE DATE:  2022      INTERPRETATION:  CLINICAL INDICATIONS:  Anoxic brain damage    COMPARISON: CT dated 2014.    TECHNIQUE: Noncontrast CT of the head. Multiplanar reformations are   submitted.    FINDINGS:  Persistent intravenous contrast from a prior contrast-enhanced study is   demonstrated. Diffuse sulcal effacement as compared to the prior head CT   dated 2014 suggesting diffuse cerebral edema.  There is periventricular and subcortical white matter hypodensity without   mass effect, nonspecific, likely representing mild chronic microvascular   ischemic changes. Left-sided approach NG tube The orbits, mastoid air   cells and visualized paranasal sinuses are unremarkable. The calvarium is   intact. Consider MRI as clinically warranted.    IMPRESSION: Diffuse sulcal effacement suggesting diffuse cerebral edema   and global hypoxic injury. Consider follow-up CT or MRI for further   evaluation.    --- End of Report ---    JOAO PHAN MD; Attending Radiologist  This document has been electronically signed. Dec 26 2022  7:37PM    < end of copied text >    < from: CT Angio Chest PE Protocol w/ IV Cont (22 @ 14:26) >    ACC: [id number] EXAM:  CT ANGIO CHEST PULM ART [facility]                          PROCEDURE DATE:  2022      INTERPRETATION:  CLINICAL INFORMATION: Shortness of breath. Evaluate for   pulmonary embolism.    COMPARISON: 3/12/2022.    CONTRAST/COMPLICATIONS:  IV Contrast: Omnipaque 350  90 cc administered   10 cc discarded  Oral Contrast: NONE  Complications: None reported at time of study completion    PROCEDURE:  CT Angiography of the Chest.  Sagittal and coronal reformats were performed aswell as 3D (MIP)   reconstructions.    FINDINGS: Bilateral pulmonary emboli identified with CT evidence for   right heart strain.    LUNGS AND AIRWAYS: Patent central airways.  Airspace consolidation is   identified within the left lower lobe. Additional regions of airspace   opacity are identified within the right upper lobe, right lower lobe and   lingular segment left upper lobe. Mosaic perfusion is identified   bilaterally.  PLEURA: No evidence for pleural effusion or pneumothorax.  MEDIASTINUM AND MARYAM: Mediastinal surgical clips identified. No   mediastinal or hilar lymphadenopathy identified.  VESSELS: Thoracic aortic caliber unremarkable.  HEART: Heart size appears within normal limits. No pericardial effusion   is identified.  CHEST WALL AND LOWER NECK: Note made of an indwelling endotracheal tube.  VISUALIZED UPPER ABDOMEN: Bilateral adrenal glands appear unremarkable.   Left renal parenchymal atrophy.  BONES: Status post sternotomy. Degenerative changes.    IMPRESSION: Bilateral pulmonary emboli with CT evidence for right heart   strain. Airspace consolidation is identified within the left lower lobe.   Additional regions of airspace opacity are identified within the right   upper lobe, right lower lobe and lingular segmentleft upper lobe. Mosaic   perfusion is identified bilaterally.    --- End of Report ---    MEHRDAD RODRIGUEZ MD; Attending Radiologist  This document has been electronically signed. Dec 26 2022  2:50PM    < end of copied text >    
Patient is a 82y old  Male who presents with a chief complaint of Cardiac arrest (26 Dec 2022 23:01)      HPI:  This patient is an 82 year old Male     PMH sign For CAD     Afib in past    Type II diabetes     brest cancer     had bpass surgery 6 weeks ago at [facility], he had been sob since than  Today he had seizurelike motions in front of wife, who called EMS  pateint ahd cpr for 45 minutes, had pulse on arrival to ED  after arrest pateint on Epinephrine drip at 1 mcg/kg/min  PH< 6.8  Pupils non reactive (26 Dec 2022 13:39)  82y old  Male who presents with a witnessed seizure and cardiac arrest. Asystole, bradycardia in field. CPR X 45 minutes and ROSC. Intubated and on pressors via IV drip. EKG shoed AF and ST depressions. ER MD called consult for possible ACS.  No c/o chest pain today before the episode. Recently with shortness of breath since CABG 6 wks ago at [facility]   cardiology. remains unresponsive, on vent    PAST MEDICAL & SURGICAL HISTORY:  Gout      Diabetes      HLD (hyperlipidemia)      Breast cancer in male  Right breast (dx )      Atrial fibrillation  1 episode in       H/O colonoscopy  between 5 to 10 yrs ago, ( to ). Was negative      History of mastectomy, bilateral  2018      S/P CABG x 3  6 weeks ago            MEDICATIONS  (STANDING):  aspirin 325 milliGRAM(s) Oral daily  atorvastatin 20 milliGRAM(s) Oral at bedtime  chlorhexidine 0.12% Liquid 15 milliLiter(s) Oral Mucosa every 12 hours  dextrose 5%. 1000 milliLiter(s) (50 mL/Hr) IV Continuous <Continuous>  dextrose 5%. 1000 milliLiter(s) (100 mL/Hr) IV Continuous <Continuous>  dextrose 50% Injectable 25 Gram(s) IV Push once  dextrose 50% Injectable 12.5 Gram(s) IV Push once  dextrose 50% Injectable 25 Gram(s) IV Push once  EPINEPHrine    Infusion 0.8 MICROgram(s)/kG/Min (240 mL/Hr) IV Continuous <Continuous>  glucagon  Injectable 1 milliGRAM(s) IntraMuscular once  heparin  Infusion.  Unit(s)/Hr (13 mL/Hr) IV Continuous <Continuous>  insulin lispro (ADMELOG) corrective regimen sliding scale   SubCutaneous every 6 hours  norepinephrine Infusion 0.05 MICROgram(s)/kG/Min (7.5 mL/Hr) IV Continuous <Continuous>  pantoprazole  Injectable 40 milliGRAM(s) IV Push daily  sodium bicarbonate  Infusion 0.234 mEq/kG/Hr (125 mL/Hr) IV Continuous <Continuous>  sodium chloride 0.9%. 1000 milliLiter(s) (1000 mL/Hr) IV Continuous <Continuous>  vasopressin Infusion 0.04 Unit(s)/Min (6 mL/Hr) IV Continuous <Continuous>    MEDICATIONS  (PRN):  dextrose Oral Gel 15 Gram(s) Oral once PRN Blood Glucose LESS THAN 70 milliGRAM(s)/deciliter  heparin   Injectable 6000 Unit(s) IV Push every 6 hours PRN For aPTT less than 40  heparin   Injectable 3000 Unit(s) IV Push every 6 hours PRN For aPTT between 40 - 57          Vital Signs Last 24 Hrs  T(C): 37.2 (27 Dec 2022 04:00), Max: 37.2 (27 Dec 2022 04:00)  T(F): 99 (27 Dec 2022 04:00), Max: 99 (27 Dec 2022 04:00)  HR: 123 (27 Dec 2022 06:00) (72 - 128)  BP: 107/66 (27 Dec 2022 06:00) (53/43 - 139/81)  BP(mean): 76 (27 Dec 2022 06:00) (46 - 98)  RR: 24 (27 Dec 2022 06:00) (21 - 35)  SpO2: 100% (27 Dec 2022 06:00) (94% - 100%)    Parameters below as of 27 Dec 2022 04:00  Patient On (Oxygen Delivery Method): ventilator    O2 Concentration (%): 40    I&O's Summary    26 Dec 2022 07:01  -  27 Dec 2022 07:00  --------------------------------------------------------  IN: 4417 mL / OUT: 675 mL / NET: 3742 mL        PHYSICAL EXAM  General Appearance: on vent  HEENT:   Neck:   Back:   Lungs: few ronchi  Heart: [id number]  Abdomen:   Extremities: no edema  Skin:   Neurologic:       INTERPRETATION OF TELEMETRY:    ECG:        LABS:                          9.8    34.30 )-----------( 213      ( 27 Dec 2022 05:46 )             31.0         142  |  107  |  38<H>  ----------------------------<  454<HH>  4.3   |  19<L>  |  2.32<H>    Ca    7.3<L>      27 Dec 2022 05:46  Phos  5.9       Mg     1.8         TPro  4.6<L>  /  Alb  1.7<L>  /  TBili  0.4  /  DBili  x   /  AST  605<H>  /  ALT  428<H>  /  AlkPhos  111            Pro BNP  569  @ 19:28  D Dimer  --  @ 19:28  Pro BNP  803  @ 12:23  D Dimer  --  @ 12:23    PT/INR - ( 26 Dec 2022 12:23 )   PT: 20.2 sec;   INR: 1.73 ratio         PTT - ( 27 Dec 2022 02:10 )  PTT:136.7 sec  Urinalysis Basic - ( 26 Dec 2022 13:15 )    Color: Yellow / Appearance: very cloudy / S.015 / pH: x  Gluc: x / Ketone: Negative  / Bili: Negative / Urobili: Negative   Blood: x / Protein: 100 / Nitrite: Negative   Leuk Esterase: Negative / RBC: 11-25 /HPF / WBC 0-2 /HPF   Sq Epi: x / Non Sq Epi: Few / Bacteria: Moderate      ABG - ( 27 Dec 2022 06:10 )  pH, Arterial: 7.30  pH, Blood: x     /  pCO2: 36    /  pO2: 112   / HCO3: 18    / Base Excess: -7.9  /  SaO2: 99                    RADIOLOGY & ADDITIONAL STUDIES:    
HPI:  This patient is an 82 year old Male     PMH sign For CAD     Afib in past    Type II diabetes     brest cancer     had bpass surgery 6 weeks ago at [facility], he had been sob since than  Today he had seizurelike motions in front of wife, who called EMS  pateint ahd cpr for 45 minutes, had pulse on arrival to ED  after arrest pateint on Epinephrine drip at 1 mcg/kg/min  PH< 6.8  Pupils non reactive     : Patient seen in bed vented, unresponsive, only breathing above the vent  : Vented.  MS the same           PAST MEDICAL & SURGICAL HISTORY:  Gout      Diabetes      HLD (hyperlipidemia)      Breast cancer in male  Right breast (dx )      Atrial fibrillation  1 episode in       H/O colonoscopy  between 5 to 10 yrs ago, ( to ). Was negative      History of mastectomy, bilateral  2018      S/P CABG x 3  6 weeks ago          FAMILY HISTORY:      Social Hx:    Allergies    No Known Allergies    Intolerances            ICU Vital Signs Last 24 Hrs  T(C): 37.6 (28 Dec 2022 04:00), Max: 38.5 (27 Dec 2022 12:08)  T(F): 99.6 (28 Dec 2022 04:00), Max: 101.3 (27 Dec 2022 12:08)  HR: 152 (28 Dec 2022 11:00) (115 - 158)  BP: 104/75 (28 Dec 2022 11:00) (61/41 - 120/90)  BP(mean): 81 (28 Dec 2022 11:00) (46 - 97)  ABP: 106/75 (28 Dec 2022 11:00) (55/37 - 171/119)  ABP(mean): 85 (28 Dec 2022 11:00) (41 - 141)  RR: 17 (28 Dec 2022 11:00) (17 - 26)  SpO2: 100% (28 Dec 2022 11:00) (96% - 100%)    O2 Parameters below as of 28 Dec 2022 11:00  Patient On (Oxygen Delivery Method): ventilator    O2 Concentration (%): 40        Mode: AC/ CMV (Assist Control/ Continuous Mandatory Ventilation)  RR (machine): 26  TV (machine): 450  FiO2: 40  PEEP: 8  ITime: 0.8  MAP: 11  PIP: 23      I&O's Summary    27 Dec 2022 07:01  -  28 Dec 2022 07:00  --------------------------------------------------------  IN: 6666.4 mL / OUT: 780 mL / NET: 5886.4 mL                              7.7    17.36 )-----------( 138      ( 28 Dec 2022 05:51 )             24.0           141  |  103  |  46<H>  ----------------------------<  418<H>  3.9   |  27  |  3.23<H>    Ca    7.0<L>      28 Dec 2022 05:51  Phos  5.4       Mg     1.7         TPro  4.5<L>  /  Alb  1.7<L>  /  TBili  0.4  /  DBili  0.1  /  AST  615<H>  /  ALT  770<H>  /  AlkPhos  96              ABG - ( 27 Dec 2022 06:10 )  pH, Arterial: 7.30  pH, Blood: x     /  pCO2: 36    /  pO2: 112   / HCO3: 18    / Base Excess: -7.9  /  SaO2: 99                  Urinalysis Basic - ( 26 Dec 2022 13:15 )    Color: Yellow / Appearance: very cloudy / S.015 / pH: x  Gluc: x / Ketone: Negative  / Bili: Negative / Urobili: Negative   Blood: x / Protein: 100 / Nitrite: Negative   Leuk Esterase: Negative / RBC: 11-25 /HPF / WBC 0-2 /HPF   Sq Epi: x / Non Sq Epi: Few / Bacteria: Moderate        MEDICATIONS  (STANDING):  aspirin 325 milliGRAM(s) Oral daily  atorvastatin 20 milliGRAM(s) Oral at bedtime  chlorhexidine 0.12% Liquid 15 milliLiter(s) Oral Mucosa every 12 hours  dextrose 5%. 1000 milliLiter(s) (50 mL/Hr) IV Continuous <Continuous>  dextrose 5%. 1000 milliLiter(s) (100 mL/Hr) IV Continuous <Continuous>  dextrose 50% Injectable 25 Gram(s) IV Push once  dextrose 50% Injectable 12.5 Gram(s) IV Push once  dextrose 50% Injectable 25 Gram(s) IV Push once  glucagon  Injectable 1 milliGRAM(s) IntraMuscular once  heparin  Infusion.  Unit(s)/Hr (13 mL/Hr) IV Continuous <Continuous>  insulin glargine Injectable (LANTUS) 15 Unit(s) SubCutaneous every morning  insulin lispro (ADMELOG) corrective regimen sliding scale   SubCutaneous every 6 hours  norepinephrine Infusion 0.3 MICROgram(s)/kG/Min (21.1 mL/Hr) IV Continuous <Continuous>  pantoprazole  Injectable 40 milliGRAM(s) IV Push daily  phenylephrine    Infusion 0.1 MICROgram(s)/kG/Min (1.4 mL/Hr) IV Continuous <Continuous>  sodium bicarbonate  Infusion 0.234 mEq/kG/Hr (125 mL/Hr) IV Continuous <Continuous>  sodium chloride 0.9%. 1000 milliLiter(s) (1000 mL/Hr) IV Continuous <Continuous>  vasopressin Infusion 0.04 Unit(s)/Min (6 mL/Hr) IV Continuous <Continuous>    MEDICATIONS  (PRN):  dextrose Oral Gel 15 Gram(s) Oral once PRN Blood Glucose LESS THAN 70 milliGRAM(s)/deciliter  heparin   Injectable 6000 Unit(s) IV Push every 6 hours PRN For aPTT less than 40  heparin   Injectable 3000 Unit(s) IV Push every 6 hours PRN For aPTT between 40 - 57      DVT Prophylaxis: [facility]    Advanced Directives:  Discussed with:    Visit Information: 30 min    ** Time is exclusive of billed procedures and/or teaching and/or routine family updates.        
  HPI:    HPI:       Pt  is a 82y old Male with hx  CAD sp bipass 6 weeks ago, Afib, DM II, BrCA .   He's had bpass surgery 6 weeks ago at [facility], he had been sob since than  day of admission he had seizurelike motions in front of wife, who called EMS  pateint ahd cpr for 45 minutes, had pulse on arrival to ED  - PH< 6.8  Pupils non reactive          pt intubated not responsive  vitals remain tenuous frequent titration of pressors necessary  family bedside long discussion regarding c   DNR ONLY today, wean/comfort tomorrow morning       pt seen and examined  wife and family bedside, ready for comfort only.  [place] [facility] [place]  decision to prceed w/ vent liberation and focus on comfort was made  case d/w attending  pt is not responsive, no signs of idscsomfort      PAIN: ( )Yes   ( )No  Pt unable to characterise d/t clinical status     DYSPNEA: ( ) Yes  ( ) No  Level: vented        Review of Systems:     Unable to obtain/Limited due to: obtunded        PHYSICAL EXAM:    Vital Signs Last 24 Hrs  T(C): 34.8 (28 Dec 2022 08:00), Max: 37.8 (28 Dec 2022 00:00)  T(F): 94.7 (28 Dec 2022 08:00), Max: 100 (28 Dec 2022 00:00)  HR: 0 (28 Dec 2022 12:30) (0 - 158)  BP: 107/80 (28 Dec 2022 12:00) (78/64 - 120/90)  BP(mean): 87 (28 Dec 2022 12:00) (67 - 97)  RR: 0 (28 Dec 2022 12:30) (0 - 26)  SpO2: 100% (28 Dec 2022 11:00) (96% - 100%)    Parameters below as of 28 Dec 2022 12:00  Patient On (Oxygen Delivery Method): ventilator    O2 Concentration (%): 40  Daily     Daily Weight in k.6 (28 Dec 2022 06:00)    PPSV2: 10  %  FAST:  General:  NAD  Mental Status:  not responsive  HEENT: dilated not responsive pupils, AT NC   Lungs: ctabl b/l bs  Cardiac: s1s2 no mgr  GI: soft nontender +BS  Ext: no erythema or swelling  Neuro: anoxic brain injury not responding blown pupils    LABS:                        7.7    17.36 )-----------( 138      ( 28 Dec 2022 05:51 )             24.0         141  |  103  |  46<H>  ----------------------------<  418<H>  3.9   |  27  |  3.23<H>    Ca    7.0<L>      28 Dec 2022 05:51  Phos  5.4       Mg     1.7         TPro  4.5<L>  /  Alb  1.7<L>  /  TBili  0.4  /  DBili  0.1  /  AST  615<H>  /  ALT  770<H>  /  AlkPhos  96      PTT - ( 28 Dec 2022 07:50 )  PTT:84.2 sec  Albumin: Albumin, Serum: 1.7 g/dL ( @ 05:51)      Allergies    No Known Allergies    Intolerances      MEDICATIONS  (STANDING):  glycopyrrolate Injectable 0.1 milliGRAM(s) IV Push every 6 hours    MEDICATIONS  (PRN):  HYDROmorphone  Injectable 1 milliGRAM(s) IV Push every 2 hours PRN RR >20      RADIOLOGY:

## 2023-01-04 NOTE — CDI QUERY NOTE - NSCDIOTHERTXTBX_GEN_ALL_CORE_HH
This patient was admitted with a PE and recently had bypass surgery 6 weeks ago:    HP Adult 12-26-22 @ 13:39:  had bpass surgery 6 weeks ago at [facility], he had been sob since than  CTA bilateral segmental PEs, echo only slighlty dilated RV, moderate pericardial effusion, no tamponade , given concern over poor mental status , will do Head ct and than start    Progress Note Adult Critical Care Attending 12-28-22 @ 11:29  A/P: 82 male S/P out of hospital arrest abd B/L PEs    Can the relationship between the current PE/out of hospital arrest and recent bypass surgery be clarified?  -PE/Out of hospital arrest due to recent bypass surgery  -PE/Out of hospital arrest unrelated to recent bypass surgery  -Other, please specify:  -Unable to determine.

## 2023-01-11 NOTE — CHART NOTE - NSCHARTNOTEFT_GEN_A_CORE
DEATH NOTE    Called to bedside to evaluate the patient for absent cardiac monitor activity    On physical exam, patient did not respond to verbal or noxious stimuli. No spontaneous respirations. Absent heart and breath sounds. Absent radial and carotid pulses. Pupils are fixed and dilated, no corneal reflex.   Patient pronounced dead at 12:30pm. Family at bedside.
[id number]    :Antoine Crawford  Dec-
unable to determine if PE/out of hospital arrest and recent bypass surgery were related

## 2023-06-01 NOTE — PROCEDURAL SAFETY CHECKLIST WITH OR WITHOUT SEDATION - NSTEAMNURSEFT_GEN_ALL_CORE
Humaira mom informed blood draw kit at the  for pick along with lab orders. She will come by tomorrow to     aparna

## 2024-05-27 NOTE — CHART NOTE - NSCHARTNOTESELECT_GEN_ALL_CORE
Event Note
Event Note
Death Note/Event Note
(1) Drift; leg falls by the end of the 5-sec period but does not hit bed
(0) No drift; leg holds 30 degree position for full 5 secs